# Patient Record
Sex: MALE | Race: BLACK OR AFRICAN AMERICAN | Employment: OTHER | URBAN - METROPOLITAN AREA
[De-identification: names, ages, dates, MRNs, and addresses within clinical notes are randomized per-mention and may not be internally consistent; named-entity substitution may affect disease eponyms.]

---

## 2018-06-07 ENCOUNTER — HOSPITAL ENCOUNTER (OUTPATIENT)
Age: 63
Setting detail: OBSERVATION
Discharge: HOME | End: 2018-06-09
Attending: EMERGENCY MEDICINE | Admitting: SURGERY

## 2018-06-07 ENCOUNTER — APPOINTMENT (OUTPATIENT)
Dept: GENERAL RADIOLOGY | Age: 63
End: 2018-06-07

## 2018-06-07 DIAGNOSIS — T18.5XXA FOREIGN BODY OF RECTUM, INITIAL ENCOUNTER: Primary | ICD-10-CM

## 2018-06-07 PROCEDURE — 99285 EMERGENCY DEPT VISIT HI MDM: CPT

## 2018-06-07 PROCEDURE — 74018 RADEX ABDOMEN 1 VIEW: CPT

## 2018-06-07 RX ORDER — MIDAZOLAM HYDROCHLORIDE 1 MG/ML
4 INJECTION INTRAMUSCULAR; INTRAVENOUS ONCE
Status: COMPLETED | OUTPATIENT
Start: 2018-06-07 | End: 2018-06-08

## 2018-06-07 RX ORDER — FENTANYL CITRATE 50 UG/ML
75 INJECTION, SOLUTION INTRAMUSCULAR; INTRAVENOUS ONCE
Status: COMPLETED | OUTPATIENT
Start: 2018-06-07 | End: 2018-06-08

## 2018-06-08 ENCOUNTER — ANESTHESIA EVENT (OUTPATIENT)
Dept: OPERATING ROOM | Age: 63
End: 2018-06-08

## 2018-06-08 ENCOUNTER — ANESTHESIA (OUTPATIENT)
Dept: OPERATING ROOM | Age: 63
End: 2018-06-08

## 2018-06-08 VITALS
OXYGEN SATURATION: 99 % | SYSTOLIC BLOOD PRESSURE: 106 MMHG | RESPIRATION RATE: 13 BRPM | DIASTOLIC BLOOD PRESSURE: 78 MMHG

## 2018-06-08 PROBLEM — T18.5XXA RECTAL FOREIGN BODY, INITIAL ENCOUNTER: Status: ACTIVE | Noted: 2018-06-08

## 2018-06-08 LAB
ANION GAP SERPL CALCULATED.3IONS-SCNC: 12 MMOL/L (ref 7–16)
BUN BLDV-MCNC: 15 MG/DL (ref 8–23)
CALCIUM SERPL-MCNC: 9.3 MG/DL (ref 8.6–10.2)
CHLORIDE BLD-SCNC: 100 MMOL/L (ref 98–107)
CO2: 27 MMOL/L (ref 22–29)
CREAT SERPL-MCNC: 0.8 MG/DL (ref 0.7–1.2)
GFR AFRICAN AMERICAN: >60
GFR NON-AFRICAN AMERICAN: >60 ML/MIN/1.73
GLUCOSE BLD-MCNC: 86 MG/DL (ref 74–109)
HCT VFR BLD CALC: 43 % (ref 37–54)
HEMOGLOBIN: 14.6 G/DL (ref 12.5–16.5)
INR BLD: 1.1
MCH RBC QN AUTO: 31 PG (ref 26–35)
MCHC RBC AUTO-ENTMCNC: 34 % (ref 32–34.5)
MCV RBC AUTO: 91.3 FL (ref 80–99.9)
PDW BLD-RTO: 14.2 FL (ref 11.5–15)
PLATELET # BLD: 247 E9/L (ref 130–450)
PMV BLD AUTO: 8.7 FL (ref 7–12)
POTASSIUM SERPL-SCNC: 4.1 MMOL/L (ref 3.5–5)
PROTHROMBIN TIME: 12.5 SEC (ref 9.3–12.4)
RBC # BLD: 4.71 E12/L (ref 3.8–5.8)
SODIUM BLD-SCNC: 139 MMOL/L (ref 132–146)
WBC # BLD: 5.9 E9/L (ref 4.5–11.5)

## 2018-06-08 PROCEDURE — 3700000000 HC ANESTHESIA ATTENDED CARE: Performed by: SURGERY

## 2018-06-08 PROCEDURE — 2709999900 HC NON-CHARGEABLE SUPPLY: Performed by: INTERNAL MEDICINE

## 2018-06-08 PROCEDURE — 7100000001 HC PACU RECOVERY - ADDTL 15 MIN: Performed by: SURGERY

## 2018-06-08 PROCEDURE — 80074 ACUTE HEPATITIS PANEL: CPT

## 2018-06-08 PROCEDURE — 6360000002 HC RX W HCPCS: Performed by: NURSE ANESTHETIST, CERTIFIED REGISTERED

## 2018-06-08 PROCEDURE — 3600000012 HC SURGERY LEVEL 2 ADDTL 15MIN: Performed by: SURGERY

## 2018-06-08 PROCEDURE — G0378 HOSPITAL OBSERVATION PER HR: HCPCS

## 2018-06-08 PROCEDURE — 3600000002 HC SURGERY LEVEL 2 BASE: Performed by: SURGERY

## 2018-06-08 PROCEDURE — 80048 BASIC METABOLIC PNL TOTAL CA: CPT

## 2018-06-08 PROCEDURE — 7100000001 HC PACU RECOVERY - ADDTL 15 MIN: Performed by: INTERNAL MEDICINE

## 2018-06-08 PROCEDURE — 7100000000 HC PACU RECOVERY - FIRST 15 MIN: Performed by: INTERNAL MEDICINE

## 2018-06-08 PROCEDURE — 3609008400 HC SIGMOIDOSCOPY DIAGNOSTIC: Performed by: INTERNAL MEDICINE

## 2018-06-08 PROCEDURE — 99221 1ST HOSP IP/OBS SF/LOW 40: CPT | Performed by: PSYCHIATRY & NEUROLOGY

## 2018-06-08 PROCEDURE — 2580000003 HC RX 258: Performed by: INTERNAL MEDICINE

## 2018-06-08 PROCEDURE — 96372 THER/PROPH/DIAG INJ SC/IM: CPT

## 2018-06-08 PROCEDURE — 85027 COMPLETE CBC AUTOMATED: CPT

## 2018-06-08 PROCEDURE — 88300 SURGICAL PATH GROSS: CPT

## 2018-06-08 PROCEDURE — 3700000001 HC ADD 15 MINUTES (ANESTHESIA): Performed by: SURGERY

## 2018-06-08 PROCEDURE — 86703 HIV-1/HIV-2 1 RESULT ANTBDY: CPT

## 2018-06-08 PROCEDURE — 36415 COLL VENOUS BLD VENIPUNCTURE: CPT

## 2018-06-08 PROCEDURE — 7100000000 HC PACU RECOVERY - FIRST 15 MIN: Performed by: SURGERY

## 2018-06-08 PROCEDURE — 6360000002 HC RX W HCPCS: Performed by: EMERGENCY MEDICINE

## 2018-06-08 PROCEDURE — 85610 PROTHROMBIN TIME: CPT

## 2018-06-08 RX ORDER — MIDAZOLAM HYDROCHLORIDE 1 MG/ML
INJECTION INTRAMUSCULAR; INTRAVENOUS PRN
Status: DISCONTINUED | OUTPATIENT
Start: 2018-06-08 | End: 2018-06-08 | Stop reason: SDUPTHER

## 2018-06-08 RX ORDER — SODIUM CHLORIDE 0.9 % (FLUSH) 0.9 %
10 SYRINGE (ML) INJECTION EVERY 12 HOURS SCHEDULED
Status: DISCONTINUED | OUTPATIENT
Start: 2018-06-08 | End: 2018-06-09 | Stop reason: HOSPADM

## 2018-06-08 RX ORDER — SODIUM CHLORIDE 0.9 % (FLUSH) 0.9 %
10 SYRINGE (ML) INJECTION PRN
Status: DISCONTINUED | OUTPATIENT
Start: 2018-06-08 | End: 2018-06-09 | Stop reason: HOSPADM

## 2018-06-08 RX ORDER — MEPERIDINE HYDROCHLORIDE 25 MG/ML
12.5 INJECTION INTRAMUSCULAR; INTRAVENOUS; SUBCUTANEOUS EVERY 5 MIN PRN
Status: DISCONTINUED | OUTPATIENT
Start: 2018-06-08 | End: 2018-06-08 | Stop reason: HOSPADM

## 2018-06-08 RX ORDER — PROMETHAZINE HYDROCHLORIDE 25 MG/ML
6.25 INJECTION, SOLUTION INTRAMUSCULAR; INTRAVENOUS
Status: DISCONTINUED | OUTPATIENT
Start: 2018-06-08 | End: 2018-06-08 | Stop reason: HOSPADM

## 2018-06-08 RX ORDER — ONDANSETRON 2 MG/ML
4 INJECTION INTRAMUSCULAR; INTRAVENOUS
Status: DISCONTINUED | OUTPATIENT
Start: 2018-06-08 | End: 2018-06-08 | Stop reason: HOSPADM

## 2018-06-08 RX ORDER — FENTANYL CITRATE 50 UG/ML
50 INJECTION, SOLUTION INTRAMUSCULAR; INTRAVENOUS EVERY 5 MIN PRN
Status: DISCONTINUED | OUTPATIENT
Start: 2018-06-08 | End: 2018-06-08 | Stop reason: HOSPADM

## 2018-06-08 RX ORDER — FENTANYL CITRATE 50 UG/ML
INJECTION, SOLUTION INTRAMUSCULAR; INTRAVENOUS PRN
Status: DISCONTINUED | OUTPATIENT
Start: 2018-06-08 | End: 2018-06-08 | Stop reason: SDUPTHER

## 2018-06-08 RX ORDER — PROPOFOL 10 MG/ML
INJECTION, EMULSION INTRAVENOUS CONTINUOUS PRN
Status: DISCONTINUED | OUTPATIENT
Start: 2018-06-08 | End: 2018-06-08 | Stop reason: SDUPTHER

## 2018-06-08 RX ORDER — KETAMINE HYDROCHLORIDE 50 MG/ML
INJECTION, SOLUTION, CONCENTRATE INTRAMUSCULAR; INTRAVENOUS
Status: DISPENSED
Start: 2018-06-08 | End: 2018-06-08

## 2018-06-08 RX ORDER — SODIUM CHLORIDE, SODIUM LACTATE, POTASSIUM CHLORIDE, CALCIUM CHLORIDE 600; 310; 30; 20 MG/100ML; MG/100ML; MG/100ML; MG/100ML
INJECTION, SOLUTION INTRAVENOUS CONTINUOUS
Status: DISCONTINUED | OUTPATIENT
Start: 2018-06-08 | End: 2018-06-09 | Stop reason: HOSPADM

## 2018-06-08 RX ORDER — FENTANYL CITRATE 50 UG/ML
25 INJECTION, SOLUTION INTRAMUSCULAR; INTRAVENOUS EVERY 5 MIN PRN
Status: DISCONTINUED | OUTPATIENT
Start: 2018-06-08 | End: 2018-06-08 | Stop reason: HOSPADM

## 2018-06-08 RX ADMIN — Medication 10 ML: at 10:51

## 2018-06-08 RX ADMIN — SODIUM CHLORIDE, POTASSIUM CHLORIDE, SODIUM LACTATE AND CALCIUM CHLORIDE: 600; 310; 30; 20 INJECTION, SOLUTION INTRAVENOUS at 15:31

## 2018-06-08 RX ADMIN — MIDAZOLAM HYDROCHLORIDE 4 MG: 1 INJECTION, SOLUTION INTRAMUSCULAR; INTRAVENOUS at 00:22

## 2018-06-08 RX ADMIN — SODIUM CHLORIDE, POTASSIUM CHLORIDE, SODIUM LACTATE AND CALCIUM CHLORIDE: 600; 310; 30; 20 INJECTION, SOLUTION INTRAVENOUS at 20:48

## 2018-06-08 RX ADMIN — SODIUM CHLORIDE, POTASSIUM CHLORIDE, SODIUM LACTATE AND CALCIUM CHLORIDE: 600; 310; 30; 20 INJECTION, SOLUTION INTRAVENOUS at 10:45

## 2018-06-08 RX ADMIN — PROPOFOL 100 MCG/KG/MIN: 10 INJECTION, EMULSION INTRAVENOUS at 15:35

## 2018-06-08 RX ADMIN — FENTANYL CITRATE 75 MCG: 50 INJECTION, SOLUTION INTRAMUSCULAR; INTRAVENOUS at 00:24

## 2018-06-08 RX ADMIN — MIDAZOLAM HYDROCHLORIDE 2 MG: 1 INJECTION INTRAMUSCULAR; INTRAVENOUS at 15:35

## 2018-06-08 RX ADMIN — FENTANYL CITRATE 100 MCG: 50 INJECTION, SOLUTION INTRAMUSCULAR; INTRAVENOUS at 15:35

## 2018-06-08 ASSESSMENT — PAIN DESCRIPTION - PAIN TYPE
TYPE: ACUTE PAIN
TYPE: ACUTE PAIN
TYPE: SURGICAL PAIN

## 2018-06-08 ASSESSMENT — ENCOUNTER SYMPTOMS
NAUSEA: 0
DIARRHEA: 0
RECTAL PAIN: 1
BACK PAIN: 0
VOMITING: 0
SHORTNESS OF BREATH: 0
SORE THROAT: 0
ABDOMINAL DISTENTION: 0
COUGH: 0
RHINORRHEA: 0

## 2018-06-08 ASSESSMENT — PAIN DESCRIPTION - ONSET
ONSET: ON-GOING
ONSET: ON-GOING

## 2018-06-08 ASSESSMENT — PAIN SCALES - GENERAL
PAINLEVEL_OUTOF10: 0
PAINLEVEL_OUTOF10: 3
PAINLEVEL_OUTOF10: 2
PAINLEVEL_OUTOF10: 0
PAINLEVEL_OUTOF10: 7
PAINLEVEL_OUTOF10: 5

## 2018-06-08 ASSESSMENT — PULMONARY FUNCTION TESTS
PIF_VALUE: 0
PIF_VALUE: 1
PIF_VALUE: 0
PIF_VALUE: 1
PIF_VALUE: 0
PIF_VALUE: 1
PIF_VALUE: 0

## 2018-06-08 ASSESSMENT — PAIN DESCRIPTION - LOCATION
LOCATION: RECTUM

## 2018-06-08 ASSESSMENT — PAIN DESCRIPTION - PROGRESSION
CLINICAL_PROGRESSION: GRADUALLY IMPROVING
CLINICAL_PROGRESSION: GRADUALLY WORSENING

## 2018-06-08 ASSESSMENT — PAIN DESCRIPTION - ORIENTATION: ORIENTATION: MID

## 2018-06-08 ASSESSMENT — PAIN DESCRIPTION - DESCRIPTORS: DESCRIPTORS: DISCOMFORT

## 2018-06-08 ASSESSMENT — PAIN DESCRIPTION - FREQUENCY
FREQUENCY: CONTINUOUS
FREQUENCY: CONTINUOUS

## 2018-06-08 ASSESSMENT — LIFESTYLE VARIABLES: SMOKING_STATUS: 1

## 2018-06-09 VITALS
DIASTOLIC BLOOD PRESSURE: 81 MMHG | WEIGHT: 150 LBS | TEMPERATURE: 97 F | HEIGHT: 72 IN | OXYGEN SATURATION: 98 % | RESPIRATION RATE: 18 BRPM | BODY MASS INDEX: 20.32 KG/M2 | SYSTOLIC BLOOD PRESSURE: 132 MMHG | HEART RATE: 85 BPM

## 2018-06-09 PROCEDURE — G0378 HOSPITAL OBSERVATION PER HR: HCPCS

## 2018-06-09 PROCEDURE — 2580000003 HC RX 258: Performed by: INTERNAL MEDICINE

## 2018-06-09 RX ADMIN — SODIUM CHLORIDE, POTASSIUM CHLORIDE, SODIUM LACTATE AND CALCIUM CHLORIDE: 600; 310; 30; 20 INJECTION, SOLUTION INTRAVENOUS at 05:53

## 2018-06-09 ASSESSMENT — PAIN SCALES - GENERAL
PAINLEVEL_OUTOF10: 0
PAINLEVEL_OUTOF10: 0

## 2018-06-11 LAB
HAV IGM SER IA-ACNC: NORMAL
HEPATITIS B CORE IGM ANTIBODY: NORMAL
HEPATITIS B SURFACE ANTIGEN INTERPRETATION: NORMAL
HEPATITIS C ANTIBODY INTERPRETATION: NORMAL
HIV-1 AND HIV-2 ANTIBODIES: NORMAL

## 2020-11-20 ENCOUNTER — APPOINTMENT (OUTPATIENT)
Dept: CT IMAGING | Age: 65
DRG: 064 | End: 2020-11-20
Payer: OTHER GOVERNMENT

## 2020-11-20 ENCOUNTER — HOSPITAL ENCOUNTER (INPATIENT)
Age: 65
LOS: 6 days | Discharge: HOME | DRG: 064 | End: 2020-11-26
Attending: EMERGENCY MEDICINE | Admitting: INTERNAL MEDICINE
Payer: OTHER GOVERNMENT

## 2020-11-20 ENCOUNTER — APPOINTMENT (OUTPATIENT)
Dept: GENERAL RADIOLOGY | Age: 65
DRG: 064 | End: 2020-11-20
Payer: OTHER GOVERNMENT

## 2020-11-20 PROBLEM — R27.0 ATAXIA: Status: ACTIVE | Noted: 2020-11-20

## 2020-11-20 LAB
ACETAMINOPHEN LEVEL: <5 MCG/ML (ref 10–30)
ALBUMIN SERPL-MCNC: 4 G/DL (ref 3.5–5.2)
ALP BLD-CCNC: 94 U/L (ref 40–129)
ALT SERPL-CCNC: 11 U/L (ref 0–40)
AMORPHOUS: NORMAL
AMPHETAMINE SCREEN, URINE: NOT DETECTED
ANION GAP SERPL CALCULATED.3IONS-SCNC: 10 MMOL/L (ref 7–16)
AST SERPL-CCNC: 15 U/L (ref 0–39)
BACTERIA: NORMAL /HPF
BARBITURATE SCREEN URINE: NOT DETECTED
BASOPHILS ABSOLUTE: 0.03 E9/L (ref 0–0.2)
BASOPHILS RELATIVE PERCENT: 0.4 % (ref 0–2)
BENZODIAZEPINE SCREEN, URINE: NOT DETECTED
BILIRUB SERPL-MCNC: 0.3 MG/DL (ref 0–1.2)
BILIRUBIN URINE: NEGATIVE
BLOOD, URINE: NEGATIVE
BUN BLDV-MCNC: 14 MG/DL (ref 8–23)
CALCIUM SERPL-MCNC: 9.2 MG/DL (ref 8.6–10.2)
CANNABINOID SCREEN URINE: POSITIVE
CHLORIDE BLD-SCNC: 99 MMOL/L (ref 98–107)
CLARITY: ABNORMAL
CO2: 28 MMOL/L (ref 22–29)
COCAINE METABOLITE SCREEN URINE: POSITIVE
COLOR: YELLOW
CREAT SERPL-MCNC: 1 MG/DL (ref 0.7–1.2)
EOSINOPHILS ABSOLUTE: 0.06 E9/L (ref 0.05–0.5)
EOSINOPHILS RELATIVE PERCENT: 0.9 % (ref 0–6)
EPITHELIAL CELLS, UA: NORMAL /HPF
ETHANOL: <10 MG/DL (ref 0–0.08)
FENTANYL SCREEN, URINE: NOT DETECTED
GFR AFRICAN AMERICAN: >60
GFR NON-AFRICAN AMERICAN: >60 ML/MIN/1.73
GLUCOSE BLD-MCNC: 103 MG/DL (ref 74–99)
GLUCOSE URINE: NEGATIVE MG/DL
HCT VFR BLD CALC: 40.5 % (ref 37–54)
HEMOGLOBIN: 13.6 G/DL (ref 12.5–16.5)
IMMATURE GRANULOCYTES #: 0 E9/L
IMMATURE GRANULOCYTES %: 0 % (ref 0–5)
KETONES, URINE: NEGATIVE MG/DL
LEUKOCYTE ESTERASE, URINE: NEGATIVE
LIPASE: 12 U/L (ref 13–60)
LYMPHOCYTES ABSOLUTE: 1.08 E9/L (ref 1.5–4)
LYMPHOCYTES RELATIVE PERCENT: 16.1 % (ref 20–42)
Lab: ABNORMAL
MCH RBC QN AUTO: 31.1 PG (ref 26–35)
MCHC RBC AUTO-ENTMCNC: 33.6 % (ref 32–34.5)
MCV RBC AUTO: 92.5 FL (ref 80–99.9)
METHADONE SCREEN, URINE: NOT DETECTED
MONOCYTES ABSOLUTE: 0.29 E9/L (ref 0.1–0.95)
MONOCYTES RELATIVE PERCENT: 4.3 % (ref 2–12)
NEUTROPHILS ABSOLUTE: 5.26 E9/L (ref 1.8–7.3)
NEUTROPHILS RELATIVE PERCENT: 78.3 % (ref 43–80)
NITRITE, URINE: NEGATIVE
OPIATE SCREEN URINE: NOT DETECTED
OXYCODONE URINE: NOT DETECTED
PDW BLD-RTO: 13.5 FL (ref 11.5–15)
PH UA: 7 (ref 5–9)
PHENCYCLIDINE SCREEN URINE: NOT DETECTED
PLATELET # BLD: 203 E9/L (ref 130–450)
PMV BLD AUTO: 8.8 FL (ref 7–12)
POTASSIUM REFLEX MAGNESIUM: 4 MMOL/L (ref 3.5–5)
PRO-BNP: 69 PG/ML (ref 0–125)
PROTEIN UA: NEGATIVE MG/DL
RBC # BLD: 4.38 E12/L (ref 3.8–5.8)
RBC UA: NORMAL /HPF (ref 0–2)
SALICYLATE, SERUM: <0.3 MG/DL (ref 0–30)
SODIUM BLD-SCNC: 137 MMOL/L (ref 132–146)
SPECIFIC GRAVITY UA: 1.02 (ref 1–1.03)
TOTAL PROTEIN: 7.5 G/DL (ref 6.4–8.3)
TRICYCLIC ANTIDEPRESSANTS SCREEN SERUM: NEGATIVE NG/ML
TROPONIN: <0.01 NG/ML (ref 0–0.03)
TSH SERPL DL<=0.05 MIU/L-ACNC: 1.73 UIU/ML (ref 0.27–4.2)
UROBILINOGEN, URINE: 0.2 E.U./DL
WBC # BLD: 6.7 E9/L (ref 4.5–11.5)
WBC UA: NORMAL /HPF (ref 0–5)

## 2020-11-20 PROCEDURE — G0480 DRUG TEST DEF 1-7 CLASSES: HCPCS

## 2020-11-20 PROCEDURE — 99223 1ST HOSP IP/OBS HIGH 75: CPT | Performed by: INTERNAL MEDICINE

## 2020-11-20 PROCEDURE — 83880 ASSAY OF NATRIURETIC PEPTIDE: CPT

## 2020-11-20 PROCEDURE — 6360000002 HC RX W HCPCS: Performed by: STUDENT IN AN ORGANIZED HEALTH CARE EDUCATION/TRAINING PROGRAM

## 2020-11-20 PROCEDURE — 93005 ELECTROCARDIOGRAM TRACING: CPT | Performed by: STUDENT IN AN ORGANIZED HEALTH CARE EDUCATION/TRAINING PROGRAM

## 2020-11-20 PROCEDURE — 80053 COMPREHEN METABOLIC PANEL: CPT

## 2020-11-20 PROCEDURE — 96375 TX/PRO/DX INJ NEW DRUG ADDON: CPT

## 2020-11-20 PROCEDURE — 96374 THER/PROPH/DIAG INJ IV PUSH: CPT

## 2020-11-20 PROCEDURE — 85025 COMPLETE CBC W/AUTO DIFF WBC: CPT

## 2020-11-20 PROCEDURE — 81001 URINALYSIS AUTO W/SCOPE: CPT

## 2020-11-20 PROCEDURE — 87088 URINE BACTERIA CULTURE: CPT

## 2020-11-20 PROCEDURE — 6360000004 HC RX CONTRAST MEDICATION: Performed by: RADIOLOGY

## 2020-11-20 PROCEDURE — 99285 EMERGENCY DEPT VISIT HI MDM: CPT

## 2020-11-20 PROCEDURE — 83690 ASSAY OF LIPASE: CPT

## 2020-11-20 PROCEDURE — 84484 ASSAY OF TROPONIN QUANT: CPT

## 2020-11-20 PROCEDURE — 1200000000 HC SEMI PRIVATE

## 2020-11-20 PROCEDURE — 84443 ASSAY THYROID STIM HORMONE: CPT

## 2020-11-20 PROCEDURE — 80307 DRUG TEST PRSMV CHEM ANLYZR: CPT

## 2020-11-20 PROCEDURE — 6360000002 HC RX W HCPCS

## 2020-11-20 PROCEDURE — 71045 X-RAY EXAM CHEST 1 VIEW: CPT

## 2020-11-20 PROCEDURE — 70496 CT ANGIOGRAPHY HEAD: CPT

## 2020-11-20 PROCEDURE — 0202U NFCT DS 22 TRGT SARS-COV-2: CPT

## 2020-11-20 RX ORDER — ACETAMINOPHEN 325 MG/1
650 TABLET ORAL EVERY 6 HOURS PRN
Status: DISCONTINUED | OUTPATIENT
Start: 2020-11-20 | End: 2020-11-26 | Stop reason: HOSPADM

## 2020-11-20 RX ORDER — POLYETHYLENE GLYCOL 3350 17 G/17G
17 POWDER, FOR SOLUTION ORAL DAILY PRN
Status: DISCONTINUED | OUTPATIENT
Start: 2020-11-20 | End: 2020-11-26 | Stop reason: HOSPADM

## 2020-11-20 RX ORDER — ONDANSETRON 2 MG/ML
4 INJECTION INTRAMUSCULAR; INTRAVENOUS ONCE
Status: COMPLETED | OUTPATIENT
Start: 2020-11-20 | End: 2020-11-20

## 2020-11-20 RX ORDER — ONDANSETRON 4 MG/1
4 TABLET, ORALLY DISINTEGRATING ORAL ONCE
Status: DISCONTINUED | OUTPATIENT
Start: 2020-11-20 | End: 2020-11-26 | Stop reason: HOSPADM

## 2020-11-20 RX ORDER — SODIUM CHLORIDE 0.9 % (FLUSH) 0.9 %
10 SYRINGE (ML) INJECTION PRN
Status: DISCONTINUED | OUTPATIENT
Start: 2020-11-20 | End: 2020-11-21 | Stop reason: SDUPTHER

## 2020-11-20 RX ORDER — LORAZEPAM 2 MG/ML
0.5 INJECTION INTRAMUSCULAR ONCE
Status: COMPLETED | OUTPATIENT
Start: 2020-11-20 | End: 2020-11-20

## 2020-11-20 RX ORDER — ACETAMINOPHEN 650 MG/1
650 SUPPOSITORY RECTAL EVERY 6 HOURS PRN
Status: DISCONTINUED | OUTPATIENT
Start: 2020-11-20 | End: 2020-11-26 | Stop reason: HOSPADM

## 2020-11-20 RX ORDER — DROPERIDOL 2.5 MG/ML
1.25 INJECTION, SOLUTION INTRAMUSCULAR; INTRAVENOUS ONCE
Status: COMPLETED | OUTPATIENT
Start: 2020-11-20 | End: 2020-11-20

## 2020-11-20 RX ORDER — ASPIRIN 81 MG/1
324 TABLET, CHEWABLE ORAL ONCE
Status: DISCONTINUED | OUTPATIENT
Start: 2020-11-20 | End: 2020-11-26 | Stop reason: HOSPADM

## 2020-11-20 RX ORDER — SODIUM CHLORIDE 0.9 % (FLUSH) 0.9 %
10 SYRINGE (ML) INJECTION EVERY 12 HOURS SCHEDULED
Status: DISCONTINUED | OUTPATIENT
Start: 2020-11-20 | End: 2020-11-21 | Stop reason: SDUPTHER

## 2020-11-20 RX ORDER — THIAMINE HYDROCHLORIDE 100 MG/ML
100 INJECTION, SOLUTION INTRAMUSCULAR; INTRAVENOUS ONCE
Status: COMPLETED | OUTPATIENT
Start: 2020-11-20 | End: 2020-11-20

## 2020-11-20 RX ORDER — NICOTINE 21 MG/24HR
1 PATCH, TRANSDERMAL 24 HOURS TRANSDERMAL DAILY
Status: DISCONTINUED | OUTPATIENT
Start: 2020-11-20 | End: 2020-11-21

## 2020-11-20 RX ORDER — SODIUM CHLORIDE 9 MG/ML
INJECTION, SOLUTION INTRAVENOUS CONTINUOUS
Status: DISCONTINUED | OUTPATIENT
Start: 2020-11-20 | End: 2020-11-23

## 2020-11-20 RX ORDER — ONDANSETRON 2 MG/ML
INJECTION INTRAMUSCULAR; INTRAVENOUS
Status: COMPLETED
Start: 2020-11-20 | End: 2020-11-20

## 2020-11-20 RX ORDER — ONDANSETRON 2 MG/ML
4 INJECTION INTRAMUSCULAR; INTRAVENOUS EVERY 6 HOURS PRN
Status: DISCONTINUED | OUTPATIENT
Start: 2020-11-20 | End: 2020-11-26 | Stop reason: HOSPADM

## 2020-11-20 RX ADMIN — IOPAMIDOL 75 ML: 755 INJECTION, SOLUTION INTRAVENOUS at 17:51

## 2020-11-20 RX ADMIN — ONDANSETRON 4 MG: 2 INJECTION INTRAMUSCULAR; INTRAVENOUS at 20:54

## 2020-11-20 RX ADMIN — DROPERIDOL 1.25 MG: 2.5 INJECTION, SOLUTION INTRAMUSCULAR; INTRAVENOUS at 17:34

## 2020-11-20 RX ADMIN — THIAMINE HYDROCHLORIDE 100 MG: 100 INJECTION, SOLUTION INTRAMUSCULAR; INTRAVENOUS at 16:44

## 2020-11-20 RX ADMIN — LORAZEPAM 1 MG: 2 INJECTION INTRAMUSCULAR; INTRAVENOUS at 15:45

## 2020-11-20 ASSESSMENT — ENCOUNTER SYMPTOMS
RHINORRHEA: 0
BLOOD IN STOOL: 0
SHORTNESS OF BREATH: 0
ABDOMINAL PAIN: 0
DIARRHEA: 0
SORE THROAT: 0
COUGH: 0
WHEEZING: 0
VOMITING: 1
NAUSEA: 1

## 2020-11-20 NOTE — ED PROVIDER NOTES
[facility]  Department of Emergency Medicine     Written by: Sandra Lozano DO  Patient Name: Annemarie Rinaldi  Attending Provider: Kevin Hill MD  Admit Date: 2020  2:22 PM  MRN: [id number]                   : 1955        Chief Complaint   Patient presents with    Psychiatric Evaluation     talking to god, god only talks to him, pt has bible that he is reading, pt found by EMS in bed of truck flapping arms and talking to god    Emesis     1x thinks he suffered from food poisoning    - Chief complaint    HPI     Patient is a 50-year-old male with possible history of schizophrenia (no official documentation or diagnosis per chart review) who presents to the ED via EMS due to reasons which are difficult ascertain; according to nursing staff they were told by EMS that they initially received a call level sometime earlier today from this patient's nephew for \"shortness of breath\", but were subsequently called to cancel. They were then later called because this patient had apparently \"woken up for the seventh time today\" and after you wake up for the seventh time that is when you die according to God? Patient also admits to x1 episode of vomiting today. On further evaluation it seems that patient has also been experiencing dizziness and gait instability x2 days which occur with movement. Per patient he is here because he thinks he has \"food poisoning\", states he vomited once today and that he had some sort of beef/rice/gravy this morning. Unsure of this patient's baseline mental status, where he lives, what medications he takes, his medical history, or if he sees any physicians for anything. Only able to tell us that he lives with his nephew.  Work-up based off reported complaints from EMS and patient, as well as patient's psychiatric presentation on arrival. Of note patient's last encounter per chart review was in 2018 for rectal foreign body; H&P from general surgeon at that time supple. Cardiovascular:      Rate and Rhythm: Normal rate and regular rhythm. Pulses: Normal pulses. Heart sounds: Normal heart sounds. Pulmonary:      Effort: Pulmonary effort is normal. No respiratory distress. Breath sounds: Normal breath sounds. No wheezing or rales. Abdominal:      General: Abdomen is flat. Bowel sounds are normal. There is no distension. Palpations: Abdomen is soft. Tenderness: There is no abdominal tenderness. There is no guarding. Musculoskeletal: Normal range of motion. General: No swelling or tenderness. Right lower leg: No edema. Left lower leg: No edema. Skin:     General: Skin is warm and dry. Neurological:      Mental Status: He is alert. Cranial Nerves: No dysarthria or facial asymmetry. Coordination: Finger-Nose-Finger Test abnormal (on the right). NIH Stroke Scale at time of initial evaluation:  1A: Level of Consciousness 0 - alert; keenly responsive   1B: Ask Month and Age 0 - answers both questions correctly   1C:  Tell Patient To Open and Close Eyes, then Hand  Squeeze 0 - performs both tasks correctly   2: Test Horizontal Extraocular Movements 0 - normal   3: Test Visual Fields 0 - no visual loss   4: Test Facial Palsy 0 - normal symmetric movement   5A: Test Left Arm Motor Drift 0 - no drift, limb holds 90 (or 45) degrees for full 10 seconds   5B: Test Right Arm Motor Drift 0 - no drift, limb holds 90 (or 45) degrees for full 10 seconds   6A: Test Left Leg Motor Drift 0 - no drift; leg holds 30 degree position for full 5 seconds   6B: Test Right Leg Motor Drift 0 - no drift; leg holds 30 degree position for full 5 seconds   7: Test Limb Ataxia   (FNF/Heel-Shin) 1 - present in one limb   8: Test Sensation 0 - normal; no sensory loss   9: Test Language/Aphasia 0 - no aphasia, normal   10: Test Dysarthria 0 - normal   11: Test Extinction/Inattention 0 - no abnormality   Total 1       Acute CVA Core Measures:       Procedures       MDM     76-year-old male presents to the ED due to with possible history of schizophrenia (no official documentation or diagnosis per chart review) who presents to the ED via EMS due to reasons which are difficult ascertain; according to nursing staff they were told by EMS that they initially received a call level sometime earlier today from this patient's nephew for \"shortness of breath\", but were subsequently called to cancel. They were then later called because this patient had apparently \"woken up for the seventh time today\" and after you wake up for the seventh time that is when you die according to god? Patient also admits to x1 episode of vomiting today. Here in the ED patient is alert and oriented, having tangential thoughts, AH, and delusions; denies SI; is obstructive to most of the history and physical. Exam ED shows abnormal finger to nose on right, truncal ataxia, and unsteady gait; patient also has delusions, and is very obstructive and eventually became more combative; was given 1 mg IV Ativan for both agitation and dizziness; later given 1.25 IV droperidol for agitation and aggressive combativeness. Work-up thus far shows positive UDS for cannabinoids and cocaine metabolites; labs otherwise negative. Please see ED course for further details. 1750: At this time patient is currently stable; disposition pending current workup including CT head without contrast, CXR, and CTA head with contrast.      I have discussed this patient with my attending, who has seen the patient and agrees with this disposition. Patient was seen and evaluated by myself and my attending Nicko Fontaine MD. Assessment and Plan discussed with attending provider, please see attestation for final plan of care.      ED Course as of Nov 20 1758 Fri Nov 20, 2020   1520 Patient evaluated, states that he does not want to be examined; refuses vitals, told nursing staff that if anyone touches him he smoking. He has never used smokeless tobacco. He reports current alcohol use. He reports current drug use. Drug: Marijuana. Family History: family history is not on file. The patients home medications have been reviewed. Allergies: Patient has no known allergies.     -------------------------------------------------- RESULTS -------------------------------------------------    LABS:  Results for orders placed or performed during the hospital encounter of 11/20/20   CBC Auto Differential   Result Value Ref Range    WBC 6.7 4.5 - 11.5 E9/L    RBC 4.38 3.80 - 5.80 E12/L    Hemoglobin 13.6 12.5 - 16.5 g/dL    Hematocrit 40.5 37.0 - 54.0 %    MCV 92.5 80.0 - 99.9 fL    MCH 31.1 26.0 - 35.0 pg    MCHC 33.6 32.0 - 34.5 %    RDW 13.5 11.5 - 15.0 fL    Platelets 269 373 - 233 E9/L    MPV 8.8 7.0 - 12.0 fL    Neutrophils % 78.3 43.0 - 80.0 %    Immature Granulocytes % 0.0 0.0 - 5.0 %    Lymphocytes % 16.1 (L) 20.0 - 42.0 %    Monocytes % 4.3 2.0 - 12.0 %    Eosinophils % 0.9 0.0 - 6.0 %    Basophils % 0.4 0.0 - 2.0 %    Neutrophils Absolute 5.26 1.80 - 7.30 E9/L    Immature Granulocytes # 0.00 E9/L    Lymphocytes Absolute 1.08 (L) 1.50 - 4.00 E9/L    Monocytes Absolute 0.29 0.10 - 0.95 E9/L    Eosinophils Absolute 0.06 0.05 - 0.50 E9/L    Basophils Absolute 0.03 0.00 - 0.20 E9/L   Comprehensive Metabolic Panel w/ Reflex to MG   Result Value Ref Range    Sodium 137 132 - 146 mmol/L    Potassium reflex Magnesium 4.0 3.5 - 5.0 mmol/L    Chloride 99 98 - 107 mmol/L    CO2 28 22 - 29 mmol/L    Anion Gap 10 7 - 16 mmol/L    Glucose 103 (H) 74 - 99 mg/dL    BUN 14 8 - 23 mg/dL    CREATININE 1.0 0.7 - 1.2 mg/dL    GFR Non-African American >60 >=60 mL/min/1.73    GFR African American >60     Calcium 9.2 8.6 - 10.2 mg/dL    Total Protein 7.5 6.4 - 8.3 g/dL    Alb 4.0 3.5 - 5.2 g/dL    Total Bilirubin 0.3 0.0 - 1.2 mg/dL    Alkaline Phosphatase 94 40 - 129 U/L    ALT 11 0 - 40 U/L    AST 15 0 - 39 U/L   Lipase   Result Value Ref Range    Lipase 12 (L) 13 - 60 U/L   Troponin   Result Value Ref Range    Troponin <0.01 0.00 - 0.03 ng/mL   Brain Natriuretic Peptide   Result Value Ref Range    Pro-BNP 69 0 - 125 pg/mL   Urinalysis, reflex to microscopic   Result Value Ref Range    Color, UA Yellow Straw/Yellow    Clarity, UA CLOUDY (A) Clear    Glucose, Ur Negative Negative mg/dL    Bilirubin Urine Negative Negative    Ketones, Urine Negative Negative mg/dL    Specific Gravity, UA 1.020 1.005 - 1.030    Blood, Urine Negative Negative    pH, UA 7.0 5.0 - 9.0    Protein, UA Negative Negative mg/dL    Urobilinogen, Urine 0.2 <2.0 E.U./dL    Nitrite, Urine Negative Negative    Leukocyte Esterase, Urine Negative Negative   URINE DRUG SCREEN   Result Value Ref Range    Amphetamine Screen, Urine NOT DETECTED Negative <1000 ng/mL    Barbiturate Screen, Ur NOT DETECTED Negative < 200 ng/mL    Benzodiazepine Screen, Urine NOT DETECTED Negative < 200 ng/mL    Cannabinoid Scrn, Ur POSITIVE (A) Negative < 50ng/mL    Cocaine Metabolite Screen, Urine POSITIVE (A) Negative < 300 ng/mL    Opiate Scrn, Ur NOT DETECTED Negative < 300ng/mL    PCP Screen, Urine NOT DETECTED Negative < 25 ng/mL    Methadone Screen, Urine NOT DETECTED Negative <300 ng/mL    Oxycodone Urine NOT DETECTED Negative <100 ng/mL    FENTANYL SCREEN, URINE NOT DETECTED Negative <1 ng/mL    Drug Screen Comment: see below    Serum Drug Screen   Result Value Ref Range    Ethanol Lvl <10 mg/dL    Acetaminophen Level <5.0 (L) 10.0 - 95.6 mcg/mL    Salicylate, Serum <5.8 0.0 - 30.0 mg/dL   Microscopic Urinalysis   Result Value Ref Range    WBC, UA 0-1 0 - 5 /HPF    RBC, UA 0-1 0 - 2 /HPF    Epithelial Cells, UA NONE SEEN /HPF    Bacteria, UA NONE SEEN None Seen /HPF    Amorphous, UA FEW    EKG 12 Lead   Result Value Ref Range    Ventricular Rate 57 BPM    Atrial Rate 57 BPM    P-R Interval 174 ms    QRS Duration 84 ms    Q-T Interval 436 ms    QTc Calculation (Bazett) 424 ms    P Axis 77 degrees

## 2020-11-20 NOTE — ED NOTES
Bed: 21  Expected date:   Expected time:   Means of arrival:   Comments:  Germán Sprague RN  11/20/20 2823

## 2020-11-21 ENCOUNTER — APPOINTMENT (OUTPATIENT)
Dept: MRI IMAGING | Age: 65
DRG: 064 | End: 2020-11-21
Payer: OTHER GOVERNMENT

## 2020-11-21 ENCOUNTER — APPOINTMENT (OUTPATIENT)
Dept: GENERAL RADIOLOGY | Age: 65
DRG: 064 | End: 2020-11-21
Payer: OTHER GOVERNMENT

## 2020-11-21 PROBLEM — Z72.0 TOBACCO ABUSE: Status: ACTIVE | Noted: 2020-11-21

## 2020-11-21 PROBLEM — F19.10 DRUG ABUSE (HCC): Status: ACTIVE | Noted: 2020-11-21

## 2020-11-21 PROBLEM — F99 PSYCHIATRIC DISORDER: Status: ACTIVE | Noted: 2020-11-21

## 2020-11-21 PROBLEM — J18.9 PNEUMONIA: Status: ACTIVE | Noted: 2020-11-21

## 2020-11-21 LAB
ADENOVIRUS BY PCR: NOT DETECTED
ANION GAP SERPL CALCULATED.3IONS-SCNC: 12 MMOL/L (ref 7–16)
BASOPHILS ABSOLUTE: 0.03 E9/L (ref 0–0.2)
BASOPHILS RELATIVE PERCENT: 0.4 % (ref 0–2)
BORDETELLA PARAPERTUSSIS BY PCR: NOT DETECTED
BORDETELLA PERTUSSIS BY PCR: NOT DETECTED
BUN BLDV-MCNC: 11 MG/DL (ref 8–23)
CALCIUM SERPL-MCNC: 9.7 MG/DL (ref 8.6–10.2)
CHLAMYDOPHILIA PNEUMONIAE BY PCR: NOT DETECTED
CHLORIDE BLD-SCNC: 100 MMOL/L (ref 98–107)
CO2: 26 MMOL/L (ref 22–29)
CORONAVIRUS 229E BY PCR: NOT DETECTED
CORONAVIRUS HKU1 BY PCR: NOT DETECTED
CORONAVIRUS NL63 BY PCR: NOT DETECTED
CORONAVIRUS OC43 BY PCR: NOT DETECTED
CREAT SERPL-MCNC: 1 MG/DL (ref 0.7–1.2)
EOSINOPHILS ABSOLUTE: 0.06 E9/L (ref 0.05–0.5)
EOSINOPHILS RELATIVE PERCENT: 0.8 % (ref 0–6)
FOLATE: 13 NG/ML (ref 4.8–24.2)
GFR AFRICAN AMERICAN: >60
GFR NON-AFRICAN AMERICAN: >60 ML/MIN/1.73
GLUCOSE BLD-MCNC: 105 MG/DL (ref 74–99)
HCT VFR BLD CALC: 45.1 % (ref 37–54)
HEMOGLOBIN: 15.2 G/DL (ref 12.5–16.5)
HUMAN METAPNEUMOVIRUS BY PCR: NOT DETECTED
HUMAN RHINOVIRUS/ENTEROVIRUS BY PCR: NOT DETECTED
IMMATURE GRANULOCYTES #: 0.01 E9/L
IMMATURE GRANULOCYTES %: 0.1 % (ref 0–5)
INFLUENZA A BY PCR: NOT DETECTED
INFLUENZA B BY PCR: NOT DETECTED
LYMPHOCYTES ABSOLUTE: 2.24 E9/L (ref 1.5–4)
LYMPHOCYTES RELATIVE PERCENT: 30.5 % (ref 20–42)
MCH RBC QN AUTO: 31.1 PG (ref 26–35)
MCHC RBC AUTO-ENTMCNC: 33.7 % (ref 32–34.5)
MCV RBC AUTO: 92.2 FL (ref 80–99.9)
MONOCYTES ABSOLUTE: 0.28 E9/L (ref 0.1–0.95)
MONOCYTES RELATIVE PERCENT: 3.8 % (ref 2–12)
MYCOPLASMA PNEUMONIAE BY PCR: NOT DETECTED
NEUTROPHILS ABSOLUTE: 4.72 E9/L (ref 1.8–7.3)
NEUTROPHILS RELATIVE PERCENT: 64.4 % (ref 43–80)
PARAINFLUENZA VIRUS 1 BY PCR: NOT DETECTED
PARAINFLUENZA VIRUS 2 BY PCR: NOT DETECTED
PARAINFLUENZA VIRUS 3 BY PCR: NOT DETECTED
PARAINFLUENZA VIRUS 4 BY PCR: NOT DETECTED
PDW BLD-RTO: 13.4 FL (ref 11.5–15)
PLATELET # BLD: 203 E9/L (ref 130–450)
PMV BLD AUTO: 9.2 FL (ref 7–12)
POTASSIUM REFLEX MAGNESIUM: 4.2 MMOL/L (ref 3.5–5)
PROCALCITONIN: 0.38 NG/ML (ref 0–0.08)
PROCALCITONIN: 0.42 NG/ML (ref 0–0.08)
RBC # BLD: 4.89 E12/L (ref 3.8–5.8)
RESPIRATORY SYNCYTIAL VIRUS BY PCR: NOT DETECTED
SARS-COV-2, PCR: NOT DETECTED
SODIUM BLD-SCNC: 138 MMOL/L (ref 132–146)
TROPONIN: <0.01 NG/ML (ref 0–0.03)
TROPONIN: <0.01 NG/ML (ref 0–0.03)
VITAMIN B-12: 642 PG/ML (ref 211–946)
WBC # BLD: 7.3 E9/L (ref 4.5–11.5)

## 2020-11-21 PROCEDURE — APPSS30 APP SPLIT SHARED TIME 16-30 MINUTES: Performed by: NURSE PRACTITIONER

## 2020-11-21 PROCEDURE — 84484 ASSAY OF TROPONIN QUANT: CPT

## 2020-11-21 PROCEDURE — 6360000002 HC RX W HCPCS: Performed by: NURSE PRACTITIONER

## 2020-11-21 PROCEDURE — 6360000002 HC RX W HCPCS: Performed by: INTERNAL MEDICINE

## 2020-11-21 PROCEDURE — 6370000000 HC RX 637 (ALT 250 FOR IP): Performed by: INTERNAL MEDICINE

## 2020-11-21 PROCEDURE — 1200000000 HC SEMI PRIVATE

## 2020-11-21 PROCEDURE — 99233 SBSQ HOSP IP/OBS HIGH 50: CPT | Performed by: INTERNAL MEDICINE

## 2020-11-21 PROCEDURE — 82746 ASSAY OF FOLIC ACID SERUM: CPT

## 2020-11-21 PROCEDURE — 84145 PROCALCITONIN (PCT): CPT

## 2020-11-21 PROCEDURE — 94640 AIRWAY INHALATION TREATMENT: CPT

## 2020-11-21 PROCEDURE — 85025 COMPLETE CBC W/AUTO DIFF WBC: CPT

## 2020-11-21 PROCEDURE — 36415 COLL VENOUS BLD VENIPUNCTURE: CPT

## 2020-11-21 PROCEDURE — 6370000000 HC RX 637 (ALT 250 FOR IP): Performed by: NURSE PRACTITIONER

## 2020-11-21 PROCEDURE — 70551 MRI BRAIN STEM W/O DYE: CPT

## 2020-11-21 PROCEDURE — 74018 RADEX ABDOMEN 1 VIEW: CPT

## 2020-11-21 PROCEDURE — 80048 BASIC METABOLIC PNL TOTAL CA: CPT

## 2020-11-21 PROCEDURE — 82607 VITAMIN B-12: CPT

## 2020-11-21 PROCEDURE — 2580000003 HC RX 258: Performed by: NURSE PRACTITIONER

## 2020-11-21 RX ORDER — LORAZEPAM 2 MG/ML
1 INJECTION INTRAMUSCULAR
Status: DISCONTINUED | OUTPATIENT
Start: 2020-11-21 | End: 2020-11-26 | Stop reason: HOSPADM

## 2020-11-21 RX ORDER — LORAZEPAM 2 MG/ML
4 INJECTION INTRAMUSCULAR
Status: DISCONTINUED | OUTPATIENT
Start: 2020-11-21 | End: 2020-11-26 | Stop reason: HOSPADM

## 2020-11-21 RX ORDER — LORAZEPAM 1 MG/1
3 TABLET ORAL
Status: DISCONTINUED | OUTPATIENT
Start: 2020-11-21 | End: 2020-11-26 | Stop reason: HOSPADM

## 2020-11-21 RX ORDER — LORAZEPAM 1 MG/1
1 TABLET ORAL
Status: DISCONTINUED | OUTPATIENT
Start: 2020-11-21 | End: 2020-11-26 | Stop reason: HOSPADM

## 2020-11-21 RX ORDER — ARFORMOTEROL TARTRATE 15 UG/2ML
15 SOLUTION RESPIRATORY (INHALATION) 2 TIMES DAILY
Status: DISCONTINUED | OUTPATIENT
Start: 2020-11-21 | End: 2020-11-26 | Stop reason: HOSPADM

## 2020-11-21 RX ORDER — LORAZEPAM 1 MG/1
2 TABLET ORAL
Status: DISCONTINUED | OUTPATIENT
Start: 2020-11-21 | End: 2020-11-26 | Stop reason: HOSPADM

## 2020-11-21 RX ORDER — SODIUM CHLORIDE 0.9 % (FLUSH) 0.9 %
10 SYRINGE (ML) INJECTION EVERY 12 HOURS SCHEDULED
Status: DISCONTINUED | OUTPATIENT
Start: 2020-11-21 | End: 2020-11-26 | Stop reason: HOSPADM

## 2020-11-21 RX ORDER — THIAMINE MONONITRATE (VIT B1) 100 MG
100 TABLET ORAL DAILY
Status: DISCONTINUED | OUTPATIENT
Start: 2020-11-21 | End: 2020-11-26 | Stop reason: HOSPADM

## 2020-11-21 RX ORDER — LORAZEPAM 2 MG/ML
3 INJECTION INTRAMUSCULAR
Status: DISCONTINUED | OUTPATIENT
Start: 2020-11-21 | End: 2020-11-26 | Stop reason: HOSPADM

## 2020-11-21 RX ORDER — LORAZEPAM 1 MG/1
4 TABLET ORAL
Status: DISCONTINUED | OUTPATIENT
Start: 2020-11-21 | End: 2020-11-26 | Stop reason: HOSPADM

## 2020-11-21 RX ORDER — LORAZEPAM 2 MG/ML
2 INJECTION INTRAMUSCULAR
Status: DISCONTINUED | OUTPATIENT
Start: 2020-11-21 | End: 2020-11-26 | Stop reason: HOSPADM

## 2020-11-21 RX ORDER — M-VIT,TX,IRON,MINS/CALC/FOLIC 27MG-0.4MG
1 TABLET ORAL DAILY
Status: DISCONTINUED | OUTPATIENT
Start: 2020-11-21 | End: 2020-11-26 | Stop reason: HOSPADM

## 2020-11-21 RX ORDER — SODIUM CHLORIDE 0.9 % (FLUSH) 0.9 %
10 SYRINGE (ML) INJECTION PRN
Status: DISCONTINUED | OUTPATIENT
Start: 2020-11-21 | End: 2020-11-26 | Stop reason: HOSPADM

## 2020-11-21 RX ORDER — ATORVASTATIN CALCIUM 40 MG/1
40 TABLET, FILM COATED ORAL NIGHTLY
Status: DISCONTINUED | OUTPATIENT
Start: 2020-11-21 | End: 2020-11-26 | Stop reason: HOSPADM

## 2020-11-21 RX ORDER — BUDESONIDE 0.5 MG/2ML
500 INHALANT ORAL 2 TIMES DAILY
Status: DISCONTINUED | OUTPATIENT
Start: 2020-11-21 | End: 2020-11-26 | Stop reason: HOSPADM

## 2020-11-21 RX ORDER — IPRATROPIUM BROMIDE AND ALBUTEROL SULFATE 2.5; .5 MG/3ML; MG/3ML
1 SOLUTION RESPIRATORY (INHALATION)
Status: DISCONTINUED | OUTPATIENT
Start: 2020-11-21 | End: 2020-11-26 | Stop reason: HOSPADM

## 2020-11-21 RX ORDER — ASPIRIN 81 MG/1
81 TABLET ORAL DAILY
Status: DISCONTINUED | OUTPATIENT
Start: 2020-11-21 | End: 2020-11-26 | Stop reason: HOSPADM

## 2020-11-21 RX ADMIN — ONDANSETRON 4 MG: 2 INJECTION INTRAMUSCULAR; INTRAVENOUS at 11:24

## 2020-11-21 RX ADMIN — AMPICILLIN SODIUM AND SULBACTAM SODIUM 3 G: 2; 1 INJECTION, POWDER, FOR SOLUTION INTRAMUSCULAR; INTRAVENOUS at 14:57

## 2020-11-21 RX ADMIN — SODIUM CHLORIDE, PRESERVATIVE FREE 10 ML: 5 INJECTION INTRAVENOUS at 11:24

## 2020-11-21 RX ADMIN — AMPICILLIN SODIUM AND SULBACTAM SODIUM 3 G: 2; 1 INJECTION, POWDER, FOR SOLUTION INTRAMUSCULAR; INTRAVENOUS at 21:00

## 2020-11-21 RX ADMIN — ASPIRIN 81 MG: 81 TABLET, FILM COATED ORAL at 15:34

## 2020-11-21 RX ADMIN — SODIUM CHLORIDE, PRESERVATIVE FREE 10 ML: 5 INJECTION INTRAVENOUS at 21:01

## 2020-11-21 RX ADMIN — ATORVASTATIN CALCIUM 40 MG: 40 TABLET, FILM COATED ORAL at 21:00

## 2020-11-21 RX ADMIN — IPRATROPIUM BROMIDE AND ALBUTEROL SULFATE 1 AMPULE: .5; 3 SOLUTION RESPIRATORY (INHALATION) at 16:29

## 2020-11-21 ASSESSMENT — PAIN SCALES - GENERAL: PAINLEVEL_OUTOF10: 0

## 2020-11-21 NOTE — ED NOTES
Patient refusing all imaging at this time. Dr. Getachew Berry made aware. No additional orders at this time.      Klaus Dominguez RN  11/20/20 5283

## 2020-11-21 NOTE — ED NOTES
Patient voiced need to move bowels. 2 person assist to restroom. Unsteady gait. Patient assisted to and from restroom without complication. Patient now agreeable to additional labwork. Specimen sent to lab.      Vasyl Brewer RN  11/21/20 2020

## 2020-11-21 NOTE — ED NOTES
Dr. Dee Ornelas at bedside to see patient. Patient had an episode of emesis while being evaluated. Ordered Zofran IV and given.             Tiffany Gillespie RN  11/20/20 1681

## 2020-11-21 NOTE — PROGRESS NOTES
normal  Neck: neck supple and non tender without mass   Pulmonary/Chest:  wheezes, no respiratory distress  Cardiovascular: normal rate, normal S1 and S2   Abdomen: soft, non-tender, non-distended, normal bowel sounds  Extremities: no cyanosis, no clubbing and no edema  Neurologic: no tremor and speech normal      Recent Labs     11/20/20  1640 11/21/20  0515    138   K 4.0 4.2   CL 99 100   CO2 28 26   BUN 14 11   CREATININE 1.0 1.0   GLUCOSE 103* 105*   CALCIUM 9.2 9.7       Recent Labs     11/20/20  1640   ALKPHOS 94   PROT 7.5   LABALBU 4.0   BILITOT 0.3   AST 15   ALT 11       Recent Labs     11/20/20  1640 11/21/20  0515   WBC 6.7 7.3   RBC 4.38 4.89   HGB 13.6 15.2   HCT 40.5 45.1   MCV 92.5 92.2   MCH 31.1 31.1   MCHC 33.6 33.7   RDW 13.5 13.4    203   MPV 8.8 9.2         Radiology:   XR CHEST PORTABLE   Final Result   Interstitial opacities are present bilaterally suggesting peribronchial   inflammatory changes or pulmonary vascular congestion. CTA HEAD W CONTRAST   Final Result   1. No acute intracranial abnormality. 2. Normal, motion degraded, CTA head      XR ABDOMEN (KUB) (SINGLE AP VIEW)    (Results Pending)   MRI BRAIN WO CONTRAST    (Results Pending)       Assessment:  Principal Problem:    Ataxia  Active Problems:    Psychiatric disorder    Pneumonia    Tobacco abuse    Drug abuse ([facility] [address]  Resolved Problems:    * No resolved hospital problems. *      Plan:  1. Ataxia:  Neurology consulted. Possibly secondary to midline cerebellar degeneration, possible fossa stroke. CTA of the head with no acute findings. MRI of the brain ordered. PT/OT. On thiamine. 2. Pneumonia:  Chest xray with opacities. Procalcitonin 0.38. Start Unasyn for possible aspiration pneumonia. Aerosols ordered. 3. Psychiatric disorder:  Patient with hallucinations and reports two other personalities. He was pink slipped in the Emergency Department. He is not currently on any medications. Consult [facility].

## 2020-11-21 NOTE — CARE COORDINATION
SOCIAL WORK / DISCHARGE PLANNING:  Resp panel with COVID negative 11/20. Sw consult noted to consider rehab. Sw did speak with pt at bedside, 1 :1 present as well. Pt did speak with Sw but train of thought is varied doesn't always directly answer question presented to him. Reports to reside with brother and possibly nephew, ambulatory without device, does not drive. He is a , reports 100 % service connected although he does not follow with the [place] as he states \" he didn't like what they say to him, they don't know me. \" and began to ramble about they want to look at your butt and not fix your eyes and teeth. Sw attempted to talk about rehab but he would change subject. Papi had good report with him and encouraged him to consider as he said \" he walks like I have had one too many\". Await PT/OT evals and psych eval. Pt did not give any info to this Sw of involvement with local mental health agencies.                  Electronically signed by MANN Lezama on 11/21/2020 at 6:52 PM

## 2020-11-21 NOTE — ED NOTES
Patient refusing labwork at this time. States he wants to be left alone. This nurse explained that the labwork can be obtained from the IV site. Patient still refusing.      Radha Oliver RN  11/21/20 2373

## 2020-11-21 NOTE — CONSULTS
History Of Present Illness: CHIEF COMPLAINT:  Chest pain     History of Present Illness:  60year old male with a hx of schizoaffective disorder, tobacco and polysubstance abuse. He is able to provide history but this is punctuated by extreme statements therefore reliability is limited. He states that today after getting out of the car with his nephew he developed a substernal pain that is sharp. Associated with headache, dizziness and shortness of breath. No current chest pain or headache but he is not able to state how long it lasted. Right-sided, has been occurring intermittently recently but he is not able to state how long. Does not describe a clear association with exertion. His nephew contacted EMS due to this. The patient's sister was contacted by the ED, apparently this is his baseline mentation. He does not follow with physicians. When seen he denies any complaints. He was got up to ambulate however had significant ataxia and quickly developed vomiting with just standing up. No nystagmus, able to move his head with no symptoms. Reports she smokes a few packs of cigarettes a day. Prior alcohol abuse, labels himself as an alcoholic, but states his last drink was in the 90s. When asked about drug abuse states he is beyond the age where that is a concern but then admits to smoking marijuana earlier today. As above per hospitalist.  Patient is interviewed and speculates that the cause of nausea and vomiting and problems with balance are secondary to \"food poisoning \". The patient is a 60 y.o. male with significant past medical history of see below who presents with above.       The patient has the following symptoms:    Change in level of consciousness: alert    New Weakness: no    Numbness or Tingling: no    Difficulty Swallowing: no    Current Medications:   Scheduled Meds:   sodium chloride flush  10 mL Intravenous 2 times per day    thiamine  100 mg Oral Daily    therapeutic posterior fossa stroke with significant vascular risk factors noted including heavy cigarette smoking. Plan: We will obtain MRI of brain. Psychiatry to see for apparent history of schizoaffective disorder as well as drug abuse cocaine. Require physical therapy. Empiric thiamine.   Thank you for consultation

## 2020-11-21 NOTE — PROCEDURES
Received call out at 9:20pm nov 20 from radiology spoke with nurse patient is refusing. Nov 21 6:45 am patient is still refusing. Cancelling orders due to refusing 2x. If patient changes his mind re submit MRI order.

## 2020-11-21 NOTE — H&P
[facility] History and Physical      CHIEF COMPLAINT:  Chest pain    History of Present Illness:  60year old male with a hx of schizoaffective disorder, tobacco and polysubstance abuse. He is able to provide history but this is punctuated by extreme statements therefore reliability is limited. He states that today after getting out of the car with his nephew he developed a substernal pain that is sharp. Associated with headache, dizziness and shortness of breath. No current chest pain or headache but he is not able to state how long it lasted. Right-sided, has been occurring intermittently recently but he is not able to state how long. Does not describe a clear association with exertion. His nephew contacted EMS due to this. The patient's sister was contacted by the ED, apparently this is his baseline mentation. He does not follow with physicians. When seen he denies any complaints. He was got up to ambulate however had significant ataxia and quickly developed vomiting with just standing up. No nystagmus, able to move his head with no symptoms. Reports she smokes a few packs of cigarettes a day. Prior alcohol abuse, labels himself as an alcoholic, but states his last drink was in the 90s. When asked about drug abuse states he is beyond the age where that is a concern but then admits to smoking marijuana earlier today.     Informant(s) for H&P: Patient    REVIEW OF SYSTEMS:  A comprehensive 14 point review of systems was negative except for: what is in the HPI    PMH:  Past Medical History:   Diagnosis Date    Prostate disorder        Surgical History:  Past Surgical History:   Procedure Laterality Date    WI LAP,SURG,COLECTOMY, PARTIAL, W/ANAST N/A 6/8/2018    ANOSCOPY AND FOREIGN BODY REMOVAL MANUALLY EXTRACTED AND IRRIGATED performed by Coretta Caba MD at [address] DX W/COLLJ SPEC BR/WA IF PFRMD N/A 6/8/2018    [address] 2. Normal, motion degraded, CTA head          EKG: no acute abnl    ASSESSMENT:      Active Problems:    Ataxia  Chest pain  Tobacco abuse  Polysubstance abuse  Hx of alcohol abuse  Schizoaffective disorder: not currently getting treated. PLAN:  Ataxia: no cerebellar sx.   -check vitamin b12/folate, MRI brain  -serial neurologic checks  -PT/OT  -consult neurology    Chest pain: ?cocaine induced  -serial tni, repeat ekg  -may need stress test once above eval is complete    Infiltrates on cxr: has lymphopenia. No other sx to suggest infectious etiology. Will rule out covid, check procalcitonin and bnp. Hold off on antimicrobials at this time. Tobacco abuse:  -nicotine patch    Schizoaffective disorder:  -will need to follow with psych as an outpt    Code Status: full  DVT prophylaxis: lovenox      NOTE: This report was transcribed using voice recognition software. Every effort was made to ensure accuracy; however, inadvertent computerized transcription errors may be present.   Electronically signed by Samantha Pepper MD on 11/20/2020 at 8:56 PM

## 2020-11-22 ENCOUNTER — APPOINTMENT (OUTPATIENT)
Dept: ULTRASOUND IMAGING | Age: 65
DRG: 064 | End: 2020-11-22
Payer: OTHER GOVERNMENT

## 2020-11-22 LAB
ANION GAP SERPL CALCULATED.3IONS-SCNC: 10 MMOL/L (ref 7–16)
BASOPHILS ABSOLUTE: 0.02 E9/L (ref 0–0.2)
BASOPHILS RELATIVE PERCENT: 0.4 % (ref 0–2)
BUN BLDV-MCNC: 13 MG/DL (ref 8–23)
CALCIUM SERPL-MCNC: 9.1 MG/DL (ref 8.6–10.2)
CHLORIDE BLD-SCNC: 99 MMOL/L (ref 98–107)
CO2: 26 MMOL/L (ref 22–29)
CREAT SERPL-MCNC: 1 MG/DL (ref 0.7–1.2)
EKG ATRIAL RATE: 57 BPM
EKG P AXIS: 77 DEGREES
EKG P-R INTERVAL: 174 MS
EKG Q-T INTERVAL: 436 MS
EKG QRS DURATION: 84 MS
EKG QTC CALCULATION (BAZETT): 424 MS
EKG R AXIS: -94 DEGREES
EKG T AXIS: 74 DEGREES
EKG VENTRICULAR RATE: 57 BPM
EOSINOPHILS ABSOLUTE: 0.15 E9/L (ref 0.05–0.5)
EOSINOPHILS RELATIVE PERCENT: 3 % (ref 0–6)
GFR AFRICAN AMERICAN: >60
GFR NON-AFRICAN AMERICAN: >60 ML/MIN/1.73
GLUCOSE BLD-MCNC: 86 MG/DL (ref 74–99)
HCT VFR BLD CALC: 45.5 % (ref 37–54)
HEMOGLOBIN: 15.1 G/DL (ref 12.5–16.5)
IMMATURE GRANULOCYTES #: 0.01 E9/L
IMMATURE GRANULOCYTES %: 0.2 % (ref 0–5)
LYMPHOCYTES ABSOLUTE: 2.51 E9/L (ref 1.5–4)
LYMPHOCYTES RELATIVE PERCENT: 50 % (ref 20–42)
MCH RBC QN AUTO: 30.7 PG (ref 26–35)
MCHC RBC AUTO-ENTMCNC: 33.2 % (ref 32–34.5)
MCV RBC AUTO: 92.5 FL (ref 80–99.9)
MONOCYTES ABSOLUTE: 0.27 E9/L (ref 0.1–0.95)
MONOCYTES RELATIVE PERCENT: 5.4 % (ref 2–12)
NEUTROPHILS ABSOLUTE: 2.06 E9/L (ref 1.8–7.3)
NEUTROPHILS RELATIVE PERCENT: 41 % (ref 43–80)
PDW BLD-RTO: 13.2 FL (ref 11.5–15)
PLATELET # BLD: 210 E9/L (ref 130–450)
PMV BLD AUTO: 8.9 FL (ref 7–12)
POTASSIUM REFLEX MAGNESIUM: 4.2 MMOL/L (ref 3.5–5)
RBC # BLD: 4.92 E12/L (ref 3.8–5.8)
SODIUM BLD-SCNC: 135 MMOL/L (ref 132–146)
URINE CULTURE, ROUTINE: NORMAL
WBC # BLD: 5 E9/L (ref 4.5–11.5)

## 2020-11-22 PROCEDURE — 2580000003 HC RX 258: Performed by: NURSE PRACTITIONER

## 2020-11-22 PROCEDURE — 80048 BASIC METABOLIC PNL TOTAL CA: CPT

## 2020-11-22 PROCEDURE — 94640 AIRWAY INHALATION TREATMENT: CPT

## 2020-11-22 PROCEDURE — 93880 EXTRACRANIAL BILAT STUDY: CPT

## 2020-11-22 PROCEDURE — 97110 THERAPEUTIC EXERCISES: CPT | Performed by: PHYSICAL THERAPIST

## 2020-11-22 PROCEDURE — 6370000000 HC RX 637 (ALT 250 FOR IP): Performed by: NURSE PRACTITIONER

## 2020-11-22 PROCEDURE — 85025 COMPLETE CBC W/AUTO DIFF WBC: CPT

## 2020-11-22 PROCEDURE — 97162 PT EVAL MOD COMPLEX 30 MIN: CPT | Performed by: PHYSICAL THERAPIST

## 2020-11-22 PROCEDURE — 6360000002 HC RX W HCPCS: Performed by: INTERNAL MEDICINE

## 2020-11-22 PROCEDURE — 1200000000 HC SEMI PRIVATE

## 2020-11-22 PROCEDURE — 6360000002 HC RX W HCPCS: Performed by: NURSE PRACTITIONER

## 2020-11-22 PROCEDURE — 97116 GAIT TRAINING THERAPY: CPT | Performed by: PHYSICAL THERAPIST

## 2020-11-22 PROCEDURE — 2580000003 HC RX 258: Performed by: INTERNAL MEDICINE

## 2020-11-22 PROCEDURE — 99233 SBSQ HOSP IP/OBS HIGH 50: CPT | Performed by: NURSE PRACTITIONER

## 2020-11-22 PROCEDURE — 6370000000 HC RX 637 (ALT 250 FOR IP): Performed by: INTERNAL MEDICINE

## 2020-11-22 PROCEDURE — 36415 COLL VENOUS BLD VENIPUNCTURE: CPT

## 2020-11-22 RX ORDER — HYDROCORTISONE 25 MG/G
CREAM TOPICAL 2 TIMES DAILY
Status: DISCONTINUED | OUTPATIENT
Start: 2020-11-22 | End: 2020-11-26 | Stop reason: HOSPADM

## 2020-11-22 RX ORDER — DOCUSATE SODIUM 100 MG/1
100 CAPSULE, LIQUID FILLED ORAL DAILY
Status: DISCONTINUED | OUTPATIENT
Start: 2020-11-22 | End: 2020-11-26 | Stop reason: HOSPADM

## 2020-11-22 RX ADMIN — AMPICILLIN SODIUM AND SULBACTAM SODIUM 3 G: 2; 1 INJECTION, POWDER, FOR SOLUTION INTRAMUSCULAR; INTRAVENOUS at 08:48

## 2020-11-22 RX ADMIN — DOCUSATE SODIUM 100 MG: 100 CAPSULE, LIQUID FILLED ORAL at 11:26

## 2020-11-22 RX ADMIN — IPRATROPIUM BROMIDE AND ALBUTEROL SULFATE 1 AMPULE: .5; 3 SOLUTION RESPIRATORY (INHALATION) at 16:59

## 2020-11-22 RX ADMIN — AMPICILLIN SODIUM AND SULBACTAM SODIUM 3 G: 2; 1 INJECTION, POWDER, FOR SOLUTION INTRAMUSCULAR; INTRAVENOUS at 02:41

## 2020-11-22 RX ADMIN — IPRATROPIUM BROMIDE AND ALBUTEROL SULFATE 1 AMPULE: .5; 3 SOLUTION RESPIRATORY (INHALATION) at 06:07

## 2020-11-22 RX ADMIN — AMPICILLIN SODIUM AND SULBACTAM SODIUM 3 G: 2; 1 INJECTION, POWDER, FOR SOLUTION INTRAMUSCULAR; INTRAVENOUS at 14:52

## 2020-11-22 RX ADMIN — ONDANSETRON 4 MG: 2 INJECTION INTRAMUSCULAR; INTRAVENOUS at 01:07

## 2020-11-22 RX ADMIN — SODIUM CHLORIDE, PRESERVATIVE FREE 10 ML: 5 INJECTION INTRAVENOUS at 08:53

## 2020-11-22 RX ADMIN — IPRATROPIUM BROMIDE AND ALBUTEROL SULFATE 1 AMPULE: .5; 3 SOLUTION RESPIRATORY (INHALATION) at 09:01

## 2020-11-22 RX ADMIN — ARFORMOTEROL TARTRATE 15 MCG: 15 SOLUTION RESPIRATORY (INHALATION) at 06:07

## 2020-11-22 RX ADMIN — ARFORMOTEROL TARTRATE 15 MCG: 15 SOLUTION RESPIRATORY (INHALATION) at 16:59

## 2020-11-22 RX ADMIN — ASPIRIN 81 MG: 81 TABLET, FILM COATED ORAL at 08:49

## 2020-11-22 RX ADMIN — IPRATROPIUM BROMIDE AND ALBUTEROL SULFATE 1 AMPULE: .5; 3 SOLUTION RESPIRATORY (INHALATION) at 14:03

## 2020-11-22 RX ADMIN — THIAMINE HCL TAB 100 MG 100 MG: 100 TAB at 08:49

## 2020-11-22 RX ADMIN — SODIUM CHLORIDE: 9 INJECTION, SOLUTION INTRAVENOUS at 22:54

## 2020-11-22 RX ADMIN — BUDESONIDE 500 MCG: 0.5 INHALANT RESPIRATORY (INHALATION) at 16:59

## 2020-11-22 RX ADMIN — AMPICILLIN SODIUM AND SULBACTAM SODIUM 3 G: 2; 1 INJECTION, POWDER, FOR SOLUTION INTRAMUSCULAR; INTRAVENOUS at 20:59

## 2020-11-22 RX ADMIN — HYDROCORTISONE 2.5%: 25 CREAM TOPICAL at 11:26

## 2020-11-22 RX ADMIN — ENOXAPARIN SODIUM 40 MG: 40 INJECTION SUBCUTANEOUS at 08:48

## 2020-11-22 RX ADMIN — MULTIPLE VITAMINS W/ MINERALS TAB 1 TABLET: TAB at 08:49

## 2020-11-22 RX ADMIN — ATORVASTATIN CALCIUM 40 MG: 40 TABLET, FILM COATED ORAL at 20:59

## 2020-11-22 RX ADMIN — HYDROCORTISONE 2.5%: 25 CREAM TOPICAL at 21:02

## 2020-11-22 RX ADMIN — BUDESONIDE 500 MCG: 0.5 INHALANT RESPIRATORY (INHALATION) at 06:07

## 2020-11-22 RX ADMIN — SODIUM CHLORIDE, PRESERVATIVE FREE 10 ML: 5 INJECTION INTRAVENOUS at 20:59

## 2020-11-22 ASSESSMENT — PAIN SCALES - GENERAL
PAINLEVEL_OUTOF10: 0
PAINLEVEL_OUTOF10: 0

## 2020-11-22 NOTE — PROGRESS NOTES
[address] [place], [place] [address]ist   Progress Note    Admitting Date and Time: 11/20/2020  2:22 PM  Admit Dx: Ataxia [R27.0]  Ataxia [R27.0]    Subjective:    Patient seen and examined. He reports being able to move better today. PT evaluated and recommended Acute Rehab. Jossy spoke with him today and felt that he was at his baseline and discontinued pink slip. ROS: denies fever, chills, cp, sob, n/v, HA unless stated above.      hydrocortisone   Rectal BID    docusate sodium  100 mg Oral Daily    sodium chloride flush  10 mL Intravenous 2 times per day    thiamine  100 mg Oral Daily    therapeutic multivitamin-minerals  1 tablet Oral Daily    ampicillin-sulbactam  3 g Intravenous Q6H    ipratropium-albuterol  1 ampule Inhalation Q4H WA    Arformoterol Tartrate  15 mcg Nebulization BID    budesonide  500 mcg Nebulization BID    aspirin  81 mg Oral Daily    atorvastatin  40 mg Oral Nightly    aspirin  324 mg Oral Once    enoxaparin  40 mg Subcutaneous Daily    ondansetron  4 mg Oral Once     sodium chloride flush, 10 mL, PRN  LORazepam, 1 mg, Q1H PRN    Or  LORazepam, 1 mg, Q1H PRN    Or  LORazepam, 2 mg, Q1H PRN    Or  LORazepam, 2 mg, Q1H PRN    Or  LORazepam, 3 mg, Q1H PRN    Or  LORazepam, 3 mg, Q1H PRN    Or  LORazepam, 4 mg, Q1H PRN    Or  LORazepam, 4 mg, Q1H PRN  acetaminophen, 650 mg, Q6H PRN    Or  acetaminophen, 650 mg, Q6H PRN  polyethylene glycol, 17 g, Daily PRN  ondansetron, 4 mg, Q6H PRN         Objective:    /80   Pulse 80   Temp 98.4 °F (36.9 °C) (Oral)   Resp 16   Wt 160 lb (72.6 kg)   SpO2 95%   BMI 21.70 kg/m²   General Appearance: alert and oriented to person, place and time and in no acute distress  Skin: warm and dry  Head: normocephalic and atraumatic  Eyes: pupils equal, round, and reactive to light, extraocular eye movements intact, conjunctivae normal  Neck: neck supple and non tender without mass   Pulmonary/Chest:  wheezes, no respiratory contralateral collateral flow. Final          Assessment:  Principal Problem:    Ataxia  Active Problems:    Psychiatric disorder    Pneumonia    Tobacco abuse    Drug abuse ([address]    Cerebellar stroke ([address]    Psychosis ([address]  Resolved Problems:    * No resolved hospital problems. *      Plan:  1. Ataxia:  Secondary to right cerebellar stroke and verterbral artery stenosis. PT recommending Acute rehab at discharge. 2. Right cerebellar stroke:  Neurology following. MRI of the brain with findings of acute to subacute right cerebellar stroke, diminutive arterial flow void associated with right vertebral artery suggestive of high-grade stenosis or occlusion at skull base. PT/OT. On thiamine. 3. Pneumonia:  Chest xray with opacities. Procalcitonin 0.38. Continue Unasyn for possible aspiration pneumonia. Aerosols ordered. 4. Psychiatric disorder:  Patient with hallucinations and reports two other personalities. He was pink slipped in the Emergency Department. He is not currently on any medications. [facility] spoke with the patient today and felt that he was at his baseline. Pink slip was discontinued. .   5. Drug abuse:  Urine drug screen was positive for cocaine and marijuana. Cessation education. 6. Tobacco abuse:  Patient declines Nicotine patch. Cessation education. 7. History of ETOH:  CIWA protocol. NOTE: This report was transcribed using voice recognition software. Every effort was made to ensure accuracy; however, inadvertent computerized transcription errors may be present.      Electronically signed by EDMAR Carbajal CNP on 11/22/2020 at 12:30 PM

## 2020-11-22 NOTE — PROGRESS NOTES
S: No new complaints  Objective:      Neuro exam 134/84, pulse 80, he is afebrile  General: awake and alert. Cranial nerve testing was normal.  Funduscopic eye exam revealed not testable. Motor exam: 5-/5. Deep tendon reflexes were absent bilaterally. Plantar responses were flexor bilaterally. Cerebellar exam noted finger to nose without dysmetria. Sensation was normal to joint position sense, light touch and a pin prick . .        Assessment:   Ataxia possibly secondary to midline cerebellar degeneration. Possible posterior fossa stroke with significant vascular risk factors noted including heavy cigarette smoking. MRI of brain positive for right cerebellar stroke with no evidence of mass-effect. Urine toxicology screen positive for cocaine         Plan:                       If carotid us shows significant stenosis then vascular surgery evaluation                    Continue to address all vascular risk factors and he will require physical therapy and possible rehabilatation. Psychiatry to evaluate. No further neurologic test suggested at this time.   Thank you for consultation

## 2020-11-22 NOTE — PROGRESS NOTES
Physical Therapy      [id number]/4429-52    Patient unavailable for physical therapy treatment due to out of room for testing:   ultrasound .

## 2020-11-22 NOTE — PROGRESS NOTES
much help from another person needed to walk in hospital room?: A Lot  How much help from another person for climbing 3-5 steps with a railing?: A Lot  AM-PAC Inpatient Mobility Raw Score : 18  AM-PAC Inpatient T-Scale Score : 43.63  Mobility Inpatient CMS 0-100% Score: 46.58  Mobility Inpatient CMS G-Code Modifier : CK    Nursing cleared patient for PT evaluation. The admitting diagnosis and active problem list as listed above have been reviewed prior to the initiation of this evaluation. OBJECTIVE;   Initial Evaluation  Date: 11/22/20 Treatment Date:     Short Term/ Long Term   Goals   Was pt agreeable to Eval/treatment? Yes    To be met in 3 days   Pain level   0/10        Bed Mobility    Rolling: Independent    Supine to sit:  Independent    Sit to supine: Independent    Scooting: Independent       Transfers Sit to stand: Minimal assist of 1    Sit to stand: Independent     Ambulation    3x75 feet using  hand held assist with Moderate assist of 1   for ataxic gait, scissoring, with poor motor control and foot placement; trial of no upper extremities support and patient required max a with loss of balance x 4    75 feet using  no device with Independent     Stair negotiation: ascended and descended   Not assessed       3 steps with rail  supervision and good foot placement   ROM Within functional limits       Strength BUE:  4+/5  RLE:  4+/5  LLE:  4+/5  coordination impaired L>R with fine motor exam in regards to speed and accuracy    Increase strength in affected mm groups by 1/3 grade   Balance Sitting EOB:  good    Dynamic Standing:  poor   Sitting EOB:  good    Dynamic Standing: fair       Patient is Alert & Oriented x person, place, time and situation and follows directions Short term memory intact  Sensation:  Patient  denies numbness and tingling     Edema:  no    Endurance: fair          Patient education  Patient educated on role of Physical Therapy, risks of immobility, safety and plan of care and risk for fall and need for inpatient rehab to regain independence and cerebellar cva     Patient response to education:   Pt verbalized understanding Pt demonstrated skill Pt requires further education in this area   Yes Partial Yes      Treatment:  Patient practiced and was instructed/facilitated in the following treatment: Patient performed upper and lower extremity reaching and wt shifting activities in standing to challenge balance and accuracy with support to prevent fall  Gait training      Therapeutic Exercises:  see above      At end of session, patient in bed with  call light and phone within reach,   all lines and tubes intact, nursing notified. Patient would benefit from continued skilled Physical Therapy to improve functional independence and quality of life. Patient's/ family goals   home however agreeable to rehab      ASSESSMENT: Patient exhibits decreased strength, balance, coordination impairing functional mobility.  Speed and accuracy of movement impaired and impacting standing activities       Plan of Care:     -Standing Balance: Perform strengthening exercises in standing to promote motor control with or without upper extremity support , Instruct patient on adequate base of support to maintain balance and Challenge balance utilizing reaching  activities beyond center of gravity    -Transfers: Provide instruction on proper hand and foot position for adequate transfer of weight onto lower extremities and use of gait device and Provide stabilization to prevent fall   -Gait: Gait training, Standing activities to improve: base of support, weight shift, weight bearing , Exercises to improve trunk control, Exercises to improve hip and knee control and standing coordination activies   -Endurance: Utilize Supervised activities to increase level of endurance to allow for safe functional mobility including transfers and gait   -Stairs: Stair training with instruction on proper technique and

## 2020-11-23 LAB
ANION GAP SERPL CALCULATED.3IONS-SCNC: 8 MMOL/L (ref 7–16)
BASOPHILS ABSOLUTE: 0.04 E9/L (ref 0–0.2)
BASOPHILS RELATIVE PERCENT: 0.9 % (ref 0–2)
BUN BLDV-MCNC: 11 MG/DL (ref 8–23)
CALCIUM SERPL-MCNC: 9.1 MG/DL (ref 8.6–10.2)
CHLORIDE BLD-SCNC: 100 MMOL/L (ref 98–107)
CO2: 28 MMOL/L (ref 22–29)
CREAT SERPL-MCNC: 1 MG/DL (ref 0.7–1.2)
EOSINOPHILS ABSOLUTE: 0.18 E9/L (ref 0.05–0.5)
EOSINOPHILS RELATIVE PERCENT: 3.8 % (ref 0–6)
GFR AFRICAN AMERICAN: >60
GFR NON-AFRICAN AMERICAN: >60 ML/MIN/1.73
GLUCOSE BLD-MCNC: 113 MG/DL (ref 74–99)
HCT VFR BLD CALC: 43.2 % (ref 37–54)
HEMOGLOBIN: 14.4 G/DL (ref 12.5–16.5)
IMMATURE GRANULOCYTES #: 0 E9/L
IMMATURE GRANULOCYTES %: 0 % (ref 0–5)
LYMPHOCYTES ABSOLUTE: 2.79 E9/L (ref 1.5–4)
LYMPHOCYTES RELATIVE PERCENT: 59.5 % (ref 20–42)
MCH RBC QN AUTO: 30.8 PG (ref 26–35)
MCHC RBC AUTO-ENTMCNC: 33.3 % (ref 32–34.5)
MCV RBC AUTO: 92.5 FL (ref 80–99.9)
MONOCYTES ABSOLUTE: 0.26 E9/L (ref 0.1–0.95)
MONOCYTES RELATIVE PERCENT: 5.5 % (ref 2–12)
NEUTROPHILS ABSOLUTE: 1.42 E9/L (ref 1.8–7.3)
NEUTROPHILS RELATIVE PERCENT: 30.3 % (ref 43–80)
PDW BLD-RTO: 13.3 FL (ref 11.5–15)
PLATELET # BLD: 197 E9/L (ref 130–450)
PMV BLD AUTO: 9 FL (ref 7–12)
POTASSIUM REFLEX MAGNESIUM: 4 MMOL/L (ref 3.5–5)
RBC # BLD: 4.67 E12/L (ref 3.8–5.8)
SODIUM BLD-SCNC: 136 MMOL/L (ref 132–146)
WBC # BLD: 4.7 E9/L (ref 4.5–11.5)

## 2020-11-23 PROCEDURE — 97166 OT EVAL MOD COMPLEX 45 MIN: CPT

## 2020-11-23 PROCEDURE — 97116 GAIT TRAINING THERAPY: CPT

## 2020-11-23 PROCEDURE — 6360000002 HC RX W HCPCS: Performed by: NURSE PRACTITIONER

## 2020-11-23 PROCEDURE — 6360000002 HC RX W HCPCS: Performed by: INTERNAL MEDICINE

## 2020-11-23 PROCEDURE — 2580000003 HC RX 258: Performed by: NURSE PRACTITIONER

## 2020-11-23 PROCEDURE — 1200000000 HC SEMI PRIVATE

## 2020-11-23 PROCEDURE — 6370000000 HC RX 637 (ALT 250 FOR IP): Performed by: INTERNAL MEDICINE

## 2020-11-23 PROCEDURE — 6370000000 HC RX 637 (ALT 250 FOR IP): Performed by: NURSE PRACTITIONER

## 2020-11-23 PROCEDURE — 97535 SELF CARE MNGMENT TRAINING: CPT

## 2020-11-23 PROCEDURE — 99233 SBSQ HOSP IP/OBS HIGH 50: CPT | Performed by: INTERNAL MEDICINE

## 2020-11-23 PROCEDURE — 85025 COMPLETE CBC W/AUTO DIFF WBC: CPT

## 2020-11-23 PROCEDURE — 80048 BASIC METABOLIC PNL TOTAL CA: CPT

## 2020-11-23 PROCEDURE — 94640 AIRWAY INHALATION TREATMENT: CPT

## 2020-11-23 PROCEDURE — 36415 COLL VENOUS BLD VENIPUNCTURE: CPT

## 2020-11-23 PROCEDURE — 2580000003 HC RX 258: Performed by: INTERNAL MEDICINE

## 2020-11-23 RX ADMIN — ARFORMOTEROL TARTRATE 15 MCG: 15 SOLUTION RESPIRATORY (INHALATION) at 17:29

## 2020-11-23 RX ADMIN — IPRATROPIUM BROMIDE AND ALBUTEROL SULFATE 1 AMPULE: .5; 3 SOLUTION RESPIRATORY (INHALATION) at 17:30

## 2020-11-23 RX ADMIN — ATORVASTATIN CALCIUM 40 MG: 40 TABLET, FILM COATED ORAL at 20:51

## 2020-11-23 RX ADMIN — IPRATROPIUM BROMIDE AND ALBUTEROL SULFATE 1 AMPULE: .5; 3 SOLUTION RESPIRATORY (INHALATION) at 06:09

## 2020-11-23 RX ADMIN — DOCUSATE SODIUM 100 MG: 100 CAPSULE, LIQUID FILLED ORAL at 08:50

## 2020-11-23 RX ADMIN — AMPICILLIN SODIUM AND SULBACTAM SODIUM 3 G: 2; 1 INJECTION, POWDER, FOR SOLUTION INTRAMUSCULAR; INTRAVENOUS at 08:50

## 2020-11-23 RX ADMIN — AMPICILLIN SODIUM AND SULBACTAM SODIUM 3 G: 2; 1 INJECTION, POWDER, FOR SOLUTION INTRAMUSCULAR; INTRAVENOUS at 20:50

## 2020-11-23 RX ADMIN — ENOXAPARIN SODIUM 40 MG: 40 INJECTION SUBCUTANEOUS at 08:50

## 2020-11-23 RX ADMIN — IPRATROPIUM BROMIDE AND ALBUTEROL SULFATE 1 AMPULE: .5; 3 SOLUTION RESPIRATORY (INHALATION) at 13:07

## 2020-11-23 RX ADMIN — HYDROCORTISONE 2.5%: 25 CREAM TOPICAL at 08:58

## 2020-11-23 RX ADMIN — BUDESONIDE 500 MCG: 0.5 INHALANT RESPIRATORY (INHALATION) at 17:30

## 2020-11-23 RX ADMIN — SODIUM CHLORIDE, PRESERVATIVE FREE 10 ML: 5 INJECTION INTRAVENOUS at 08:50

## 2020-11-23 RX ADMIN — ASPIRIN 81 MG: 81 TABLET, FILM COATED ORAL at 08:50

## 2020-11-23 RX ADMIN — BUDESONIDE 500 MCG: 0.5 INHALANT RESPIRATORY (INHALATION) at 06:09

## 2020-11-23 RX ADMIN — AMPICILLIN SODIUM AND SULBACTAM SODIUM 3 G: 2; 1 INJECTION, POWDER, FOR SOLUTION INTRAMUSCULAR; INTRAVENOUS at 02:23

## 2020-11-23 RX ADMIN — SODIUM CHLORIDE: 9 INJECTION, SOLUTION INTRAVENOUS at 20:49

## 2020-11-23 RX ADMIN — IPRATROPIUM BROMIDE AND ALBUTEROL SULFATE 1 AMPULE: .5; 3 SOLUTION RESPIRATORY (INHALATION) at 09:08

## 2020-11-23 RX ADMIN — ARFORMOTEROL TARTRATE 15 MCG: 15 SOLUTION RESPIRATORY (INHALATION) at 06:09

## 2020-11-23 RX ADMIN — HYDROCORTISONE 2.5%: 25 CREAM TOPICAL at 20:51

## 2020-11-23 RX ADMIN — MULTIPLE VITAMINS W/ MINERALS TAB 1 TABLET: TAB at 08:50

## 2020-11-23 RX ADMIN — SODIUM CHLORIDE: 9 INJECTION, SOLUTION INTRAVENOUS at 08:50

## 2020-11-23 RX ADMIN — THIAMINE HCL TAB 100 MG 100 MG: 100 TAB at 08:50

## 2020-11-23 RX ADMIN — AMPICILLIN SODIUM AND SULBACTAM SODIUM 3 G: 2; 1 INJECTION, POWDER, FOR SOLUTION INTRAMUSCULAR; INTRAVENOUS at 14:12

## 2020-11-23 RX ADMIN — SODIUM CHLORIDE, PRESERVATIVE FREE 10 ML: 5 INJECTION INTRAVENOUS at 20:51

## 2020-11-23 ASSESSMENT — PAIN SCALES - GENERAL
PAINLEVEL_OUTOF10: 0

## 2020-11-23 NOTE — CARE COORDINATION
11/23/2020 1630 CM note: COVID (-) 11/20/2020. Met with patient at the bedside to discuss discharge planning. Patient states he resides with his brother and nephew and his plan is to return home when medically stable and he does not anticipate any needs. Patient states he is a  is 100% service connected and he is agreeable to us contacting the [place] of his admit. Notified our CM/SW assistant Breanna to notify the [place]. Patient states he does not have a PCP and refuses to let us set him up with a PCP stating \"its not necessary he has God and that's all he needs and he will return to our hospital if he has any further needs\". Pts sister will provide transportation at discharge.  Electronically signed by Dianna Reyes RN on 11/23/2020 at 4:34 PM

## 2020-11-23 NOTE — PROGRESS NOTES
declines to attempt to tomas; reports father assists with LBD at baseline    SBA with use of LB AE   Bathing Mod A  simulated    Min A   Toileting Mod A  simulated    SBA   Bed Mobility  Rolling: independent  Supine to sit: Min A with HOB elevated  Sit to supine: Min A  independent   Functional Transfers Sit to stand: Min A  Stand to sit: Min A  independent   Functional Mobility Min A with hemicane  For side steps [place] [facility], reports discomfort with use of hemicane, proceed with AE per PT recommendations in future sessions  Mod I   Balance Sitting:     Static:  SBA    Dynamic: Min A  Standing: Min A with hemicane     Endurance/Activity Tolerance good     Visual/  Perceptual Glasses: yes              Hand dominance: R  UE ROM: RUE: WFL  LUE: WFL  Strength: RUE: grossly 4-/5 LUE: grossly 3+/5   Strength: WFL, R worse than L  Fine Motor Coordination: WFL    Hearing: WFL  Sensation: No c/o numbness or tingling at present  Tone: WNL  Edema: unremarkable                            Comments: Upon arrival patient supine with HOB slightly elevated. Min to mod cues required for attention to task, patient demo's flights of ideas throughout session. Reports scrotal discomfort with bed mobility. Bed mobility, sitting/standing balance, LB dressing, feeding, short distance functional mobility, and cognition addressed. After session, patient long sitting with all devices within reach, all lines and tubes intact. Bed alarm on. Pt required cues and education as noted above for safe facilitation and completion of tasks. Therapist provided skilled monitoring of patient's response during treatment session. Prior to and at the end of session, environmental modifications/line management completed for patients safety and efficiency of treatment session. Overall, patient demonstrates moderate difficulties with completion of BADLs and IADLs.  Factors contributing to these difficulties include impaired cognition, impaired safety, impaired sitting and standing tolerance, decreased endurance, and generalized weakness. As noted above, patient likely to benefit from further OT intervention to increase independence, safety, and overall quality of life. Patient reports his father assists him with ADL activities, unsure of appropriate assistance father is able to provide- several phone calls made to patient's room throughout session but no sound coming from the other line, patient believes this is his father calling. Treatment:  · Bed mobility: Facilitated bed mobility with cues for proper body mechanics and sequencing to prepare for ADL completion. · Functional transfers: Facilitated transfers from various surfaces with cues for body alignment, safety and hand placement. · ADL completion: Self-care retraining for the above-mentioned ADLs; training on proper hand placement, safety technique, sequencing, and energy conservation techniques. · Postural Balance: Sitting and standing balance retraining to improve righting reactions with postural changes during ADLs. · Cognitive/Perceptual training: retraining exercises to improve attention, mentation, and spatial awareness for ADLs & transfers. · Skilled positioning: Proper positioning to improve interaction with environment, overall functioning and decrease/prevent edema and contractures. Eval Complexity:   · Medium Complexity  · History: Expanded review of medical records and additional review of physical, cognitive, or psychosocial history related to current functional performance  · Exam: 3+ performance deficits  · Assistance/Modification: Min/mod assistance or modifications required to perform tasks. May have comorbidities that affect occupational performance.     Assessment of current deficits   Functional mobility [x]  ADLs [x] Strength [x]  Cognition [x]  Functional transfers  [x] IADLs [x] Safety Awareness [x]  Endurance [x]  Fine Motor Coordination [] Balance [x] Vision/perception [] Sensation []   Gross Motor Coordination [] ROM [x] Delirium []                  Motor Control []    Plan of Care: 1-3 days/week for 1-2 weeks PRN   [x]ADL retraining/adapted techniques and AE recommendations to increase functional independence within precautions                    [x]Energy conservation techniques to improve tolerance for selfcare routine   [x]Functional transfer/mobility training/DME recommendations for increased independence, safety and fall prevention         [x]Patient/family education to increase safety and functional independence             [x]Environmental modifications for safe mobility and completion of ADLs                             [x]Cognitive retraining ex's to improve problem solving skills & safe participation in ADLs/IADLs     []Sensory re-education techniques to improve extremity awareness, maintain skin integrity and improve hand function                             [x]Visual/Perceptual retraining  to improve body awareness and safety during transfers and ADLs  []Splinting/positioning needs to maintain joint/skin integrity and prevent contractures  [x]Therapeutic activity to improve functional performance during ADLs. [x]Therapeutic exercise to improve tolerance and functional strength for ADLs   [x]Balance retraining/tolerance tasks for facilitation of postural control with dynamic challenges during ADLs . []Neuromuscular re-education: facilitation of righting/equilibrium reactions, midline orientation, scapular stability/mobility, Normalization muscle     tone and facilitation active functional movement/Attention                         []Delirium prevention/treatment    [x]Positioning to improve functional independence  []Other:       Rehab Potential:  Good for established goals     Patient / Family Goal: Go home      Patient and/or family were instructed on functional diagnosis, prognosis/goals and OT plan of care.  Demonstrated fair understanding. Eval Complexity:  Medium    Time In: 1028  Time Out: 1056  Total Time: 28 minutes    Min Units   OT Eval Low 14996       OT Eval Medium 97166  X 1   OT Eval High 11264       OT Re-Eval [id number]       Therapeutic Ex 30861       Therapeutic Activities 17437       ADL/Self Care 90743  18 1    Orthotic Management 30324       Neuro Re-Ed 93277       Non-Billable Time          Evaluation Time includes thorough review of current medical information, gathering information on past medical history/social history and prior level of function, completion of standardized testing/informal observation of tasks, assessment of data and education on plan of care and goals.     Mazin Orozco, OTR/L  [id number]

## 2020-11-23 NOTE — PROGRESS NOTES
Physical Therapy    Physical Therapy Initial Evaluation    Room #:  5937/7932-19  Patient Name: Jorge Maria  YOB: 1955  MRN: [id number]    Referring Provider: Beau Mcmullen MD    Date of Service: 11/23/2020    Evaluating Physical Therapist: Candi Hair, PT  #[id number]       Diagnosis:   Ataxia [R27.0]  Ataxia [R27.0] Admitted with right cerebellar stroke     Patient Active Problem List   Diagnosis    Rectal foreign body, initial encounter    Ataxia    Psychiatric disorder    Pneumonia    Tobacco abuse    [facility]    Cerebellar stroke ([facility]    Psychosis ([facility]      Tentative placement recommendation: Subacute rehab or Acute rehab (patient had no preference)  Equipment recommendation: To be determined      Prior Level of Function: Patient ambulated independently    Rehab Potential: good for baseline    Past medical history:   Past Medical History:   Diagnosis Date    Prostate disorder      Past Surgical History:   Procedure Laterality Date    [place] LAP,SURG,COLECTOMY, PARTIAL, W/ANAST N/A 6/8/2018    ANOSCOPY AND FOREIGN BODY REMOVAL MANUALLY EXTRACTED AND IRRIGATED performed by Comfort Helton MD at [address] [facility] FLX DX W/COLLJ [address] BR/WA IF PFRMD N/A 6/8/2018    ANAL PROCTO SIGMOIDOSCOPY FLEXIBLE performed by Marcelino Buenrostro DO at [facility] ENDOSCOPY     Precautions:  Up with assistance, falls, alarm and behavior, hallucinations constant observation    SUBJECTIVE:    Social history: Patient lives with family in a two story home resides first and basement with 3 steps down to enter with [place] [employer] owned: None,       [address]   How much difficulty turning over in bed?: None  How much difficulty sitting down on / standing up from a chair with arms?: A Little  How much difficulty moving from lying on back to sitting on side of bed?: None  How much help from another person moving to and from a bed to a chair?: A Little  How much help from another person needed to walk in hospital room?: A Little  How much help from another person for climbing 3-5 steps with a railing?: A Lot  AM-PAC Inpatient Mobility Raw Score : 19  AM-PAC Inpatient T-Scale Score : 45.44  Mobility Inpatient CMS 0-100% Score: 41.77  Mobility Inpatient CMS G-Code Modifier : CK    Nursing cleared patient for PT treatment. OBJECTIVE;   Initial Evaluation  Date: 11/22/20 Treatment Date:  11/23/2020   Short Term/ Long Term   Goals   Was pt agreeable to Eval/treatment? Yes   Yes To be met in 3 days   Pain level   0/10    0/10    Bed Mobility  Rolling: Independent    Supine to sit: Independent    Sit to supine: Independent    Scooting: Independent   Rolling: Independent   Supine to sit: Independent   Sit to supine: Independent   Scooting: Independent     Transfers Sit to stand: Minimal assist of 1   Sit to stand: Minimal assist of 1; Patient was given instruction for sit to stand transfers regarding weight shifting, sequencing, and proper foot/hand placement to achieve an effective stand with proper knee extension and body mechanics. Sit to stand: Independent     Ambulation    3x75 feet using  hand held assist with Moderate assist of 1   for ataxic gait, scissoring, with poor motor control and foot placement; trial of no upper extremities support and patient required max a with loss of balance x 4  Patient ambulated 75 feet x 2 using wheeled walker with Minimal assist of 1. Verbal cues were given for safety, upright posture, increased base of support, walker management, decreased pace, and obstacle negotiation. Patient would have occasional posterior lean x 4 occurences.   75 feet using  no device with Independent     Stair negotiation: ascended and descended   Not assessed    Not assessed   3 steps with rail  supervision and good foot placement   ROM Within functional limits       Strength BUE:  4+/5  RLE:  4+/5  LLE:  4+/5  coordination impaired L>R with fine motor exam in regards to speed and accuracy    Increase strength in affected mm groups by 1/3 grade   Balance Sitting EOB:  good    Dynamic Standing:  poor  Sitting EOB: good   Dynamic Standing: fair using wheeled walker Sitting EOB:  good    Dynamic Standing: fair       Patient is Alert & Oriented x person, place, time and situation and follows directions; Short term memory intact  Sensation: Patient denies numbness and tingling     Edema:  no    Endurance: fair          Patient education  Patient educated on role of Physical Therapy, risks of immobility, safety and plan of care and risk for fall and need for inpatient rehab to regain independence and cerebellar cva. Patient was educated and facilitated on techniques to increase safety and independence with bed mobility, balance, functional transfers, and functional mobility. Patient response to education:   Pt verbalized understanding Pt demonstrated skill Pt requires further education in this area   Yes Partial Yes      Treatment:  Patient practiced and was instructed/facilitated in the following treatment:  Patient transferred to edge of bed and sat up x 3 minutes to increase dynamic sitting balance and activity tolerance. Patient performed sit to stand transfers and gait training. Patient performed below standing exercises. Patient was returned to bed at end of treatment. Therapeutic Exercises: Patient performed standing squat, calf raise and marching x 10 reps. Verbal cues were given for correct technique and pacing. At end of session, patient in bed with alarm activated, call light and phone within reach, all lines and tubes intact, nursing notified. Patient would benefit from continued skilled Physical Therapy to improve functional independence and quality of life.           Patient's/ family goals   home however agreeable to rehab      ASSESSMENT: Patient not as unsteady today with use of wheeled walker and did not display a scissoring gait compared to yesterday. However, patient still at risk of falls due to impaired coordination, balance and narrow base of support. Speed and accuracy of movement also impaired and impacting standing activities for functional independence. Plan of Care:   -Standing Balance: Perform strengthening exercises in standing to promote motor control with or without upper extremity support , Instruct patient on adequate base of support to maintain balance and Challenge balance utilizing reaching  activities beyond center of gravity    -Transfers: Provide instruction on proper hand and foot position for adequate transfer of weight onto lower extremities and use of gait device and Provide stabilization to prevent fall   -Gait: Gait training, Standing activities to improve: base of support, weight shift, weight bearing , Exercises to improve trunk control, Exercises to improve hip and knee control and standing coordination activies   -Endurance: Utilize Supervised activities to increase level of endurance to allow for safe functional mobility including transfers and gait   -Stairs: Stair training with instruction on proper technique and hand placement on rail    Patient and or family understand(s) diagnosis, prognosis, and plan of care. Frequency of treatments: Patient will be seen daily.       Time in: 1:59  Time out: 2:11    Total Treatment Time: 12 minutes      CPT codes:  Gait Training (41819) 12 minutes 1 unit(s)      Bing Lee PTA   LIC# [id number]

## 2020-11-23 NOTE — PROGRESS NOTES
Physical Therapy    Physical Therapy Initial Evaluation    Room #:  9972/7263-37  Patient Name: Andreas Green  YOB: 1955  MRN: [id number]    Referring Provider: France Olivera MD    Date of Service: 11/23/2020    Evaluating Physical Therapist: Sherri Daigle, PT  #[id number]       Diagnosis:   Ataxia [R27.0]  Ataxia [R27.0] Admitted with right cerebellar stroke     Patient Active Problem List   Diagnosis    Rectal foreign body, initial encounter    Ataxia    Psychiatric disorder    Pneumonia    Tobacco abuse    [facility]    [facility]erebellar stroke ([facility]    Psychosis ([facility]      Tentative placement recommendation: Acute rehab (patient had no preference)  Equipment recommendation: To be determined      Prior Level of Function: Patient ambulated independently    Rehab Potential: good for baseline    Past medical history:   Past Medical History:   Diagnosis Date    Prostate disorder      Past Surgical History:   Procedure Laterality Date    LA LAP,SURG,COLECTOMY, PARTIAL, W/ANAST N/A 6/8/2018    ANOSCOPY AND FOREIGN BODY REMOVAL MANUALLY EXTRACTED AND IRRIGATED performed by Coretta Caba MD at [address] [facility] FLX DX W/COLLJ [address] BR/WA IF PFRMD N/A 6/8/2018    ANAL PROCTO SIGMOIDOSCOPY FLEXIBLE performed by Maryellen Pickard DO at [facility] ENDOSCOPY     Precautions:  Up with assistance, falls, alarm and behavior, hallucinations constant observation    SUBJECTIVE:    Social history: Patient lives with family in a two story home resides first and [facility] with 3 steps down to enter with [place] [employer] owned: None,       [address]   How much difficulty turning over in bed?: None  How much difficulty sitting down on / standing up from a chair with arms?: A Little  How much difficulty moving from lying on back to sitting on side of bed?: None  How much help from another person moving to and from a bed to a chair?: A Little  How much help from another person needed to walk in hospital room?: A Little  How much help from another person for climbing 3-5 steps with a railing?: A Lot  AM-PAC Inpatient Mobility Raw Score : 19  AM-PAC Inpatient T-Scale Score : 45.44  Mobility Inpatient CMS 0-100% Score: 41.77  Mobility Inpatient CMS G-Code Modifier : CK    Nursing cleared patient for PT treatment. OBJECTIVE;   Initial Evaluation  Date: 11/22/20 Treatment Date:  11/23/2020   Short Term/ Long Term   Goals   Was pt agreeable to Eval/treatment? Yes   Yes To be met in 3 days   Pain level   0/10    0/10    Bed Mobility  Rolling: Independent    Supine to sit: Independent    Sit to supine: Independent    Scooting: Independent   Rolling: Independent   Supine to sit: Independent   Sit to supine: Independent   Scooting: Independent     Transfers Sit to stand: Minimal assist of 1   Sit to stand: Minimal assist of 1; Patient was given instruction for sit to stand transfers regarding weight shifting, sequencing, and proper foot/hand placement to achieve an effective stand with proper knee extension and body mechanics. Sit to stand: Independent     Ambulation    3x75 feet using  hand held assist with Moderate assist of 1   for ataxic gait, scissoring, with poor motor control and foot placement; trial of no upper extremities support and patient required max a with loss of balance x 4  Patient ambulated 75 feet x 2 using wheeled walker with Minimal assist of 1. Verbal cues were given for safety, upright posture, increased base of support, walker management, decreased pace, and obstacle negotiation.   75 feet using  no device with Independent     Stair negotiation: ascended and descended   Not assessed    Not assessed   3 steps with rail  supervision and good foot placement   ROM Within functional limits       Strength BUE:  4+/5  RLE:  4+/5  LLE:  4+/5  coordination impaired L>R with fine motor exam in regards to speed and accuracy    Increase strength in affected mm groups by 1/3 grade   Balance Sitting EOB:  good    Dynamic Standing:  poor  Sitting EOB: good   Dynamic Standing: fair using wheeled walker Sitting EOB:  good    Dynamic Standing: fair       Patient is Alert & Oriented x person, place, time and situation and follows directions; Short term memory intact  Sensation: Patient denies numbness and tingling     Edema:  no    Endurance: fair          Patient education  Patient educated on role of Physical Therapy, risks of immobility, safety and plan of care and risk for fall and need for inpatient rehab to regain independence and cerebellar cva. Patient was educated and facilitated on techniques to increase safety and independence with bed mobility, balance, functional transfers, and functional mobility. Patient response to education:   Pt verbalized understanding Pt demonstrated skill Pt requires further education in this area   Yes Partial Yes      Treatment:  Patient practiced and was instructed/facilitated in the following treatment:  Patient transferred to edge of bed and sat up x 5 minutes to increase dynamic sitting balance and activity tolerance. Patient performed sit to stand transfers and gait training. Patient performed below standing exercises. Patient was returned to bed at end of treatment. Therapeutic Exercises: Patient performed standing squat, calf raise and marching x 10 reps. Verbal cues were given for correct technique and pacing. At end of session, patient in bed with alarm activated, call light and phone within reach, all lines and tubes intact, nursing notified. Patient would benefit from continued skilled Physical Therapy to improve functional independence and quality of life. Patient's/ family goals   home however agreeable to rehab      ASSESSMENT: Patient not as unsteady today with use of wheeled walker and did not display a scissoring gait compared to yesterday.  However, patient still at risk of falls due to impaired coordination, balance and narrow base of support. Speed and accuracy of movement also impaired and impacting standing activities. Plan of Care:   -Standing Balance: Perform strengthening exercises in standing to promote motor control with or without upper extremity support , Instruct patient on adequate base of support to maintain balance and Challenge balance utilizing reaching  activities beyond center of gravity    -Transfers: Provide instruction on proper hand and foot position for adequate transfer of weight onto lower extremities and use of gait device and Provide stabilization to prevent fall   -Gait: Gait training, Standing activities to improve: base of support, weight shift, weight bearing , Exercises to improve trunk control, Exercises to improve hip and knee control and standing coordination activies   -Endurance: Utilize Supervised activities to increase level of endurance to allow for safe functional mobility including transfers and gait   -Stairs: Stair training with instruction on proper technique and hand placement on rail    Patient and or family understand(s) diagnosis, prognosis, and plan of care. Frequency of treatments: Patient will be seen daily.       Time in: 8:31  Time out: 8:45    Total Treatment Time: 14 minutes      CPT codes:  Gait Training (13170) 14 minutes 1 unit(s)      Isaías Bailey PTA   LIC# [id number]

## 2020-11-23 NOTE — PLAN OF CARE
Problem: Falls - Risk of:  Goal: Will remain free from falls  Description: Will remain free from falls  11/23/2020 1644 by Wil Kirk RN  Outcome: Met This Shift     Problem: Falls - Risk of:  Goal: Absence of physical injury  Description: Absence of physical injury  11/23/2020 1644 by Wil Kirk RN  Outcome: Met This Shift

## 2020-11-23 NOTE — PROGRESS NOTES
[address] [place], [place] [address]ist   Progress Note    Admitting Date and Time: 11/20/2020  2:22 PM  Admit Dx: Ataxia [R27.0]  Ataxia [R27.0]    Subjective/interval history:    Pt denies any specific complaints today. It is difficult to obtain his subjective thoughts, as he is very tangential at this time. He is agreeable to going to subacute rehab. ROS: denies fever, chills, cp, sob, n/v, HA unless stated above.  hydrocortisone   Rectal BID    docusate sodium  100 mg Oral Daily    sodium chloride flush  10 mL Intravenous 2 times per day    thiamine  100 mg Oral Daily    therapeutic multivitamin-minerals  1 tablet Oral Daily    ampicillin-sulbactam  3 g Intravenous Q6H    ipratropium-albuterol  1 ampule Inhalation Q4H WA    Arformoterol Tartrate  15 mcg Nebulization BID    budesonide  500 mcg Nebulization BID    aspirin  81 mg Oral Daily    atorvastatin  40 mg Oral Nightly    aspirin  324 mg Oral Once    enoxaparin  40 mg Subcutaneous Daily    ondansetron  4 mg Oral Once     sodium chloride flush, 10 mL, PRN  LORazepam, 1 mg, Q1H PRN    Or  LORazepam, 1 mg, Q1H PRN    Or  LORazepam, 2 mg, Q1H PRN    Or  LORazepam, 2 mg, Q1H PRN    Or  LORazepam, 3 mg, Q1H PRN    Or  LORazepam, 3 mg, Q1H PRN    Or  LORazepam, 4 mg, Q1H PRN    Or  LORazepam, 4 mg, Q1H PRN  acetaminophen, 650 mg, Q6H PRN    Or  acetaminophen, 650 mg, Q6H PRN  polyethylene glycol, 17 g, Daily PRN  ondansetron, 4 mg, Q6H PRN         Objective:    BP (!) 160/110   Pulse 94   Temp 99 °F (37.2 °C) (Oral)   Resp 16   Wt 160 lb (72.6 kg)   SpO2 96%   BMI 21.70 kg/m²   General Appearance: alert and oriented to person, place and time and in no acute distress  Skin: warm and dry  Head: normocephalic.   Right ear with congenital deformity noted  Eyes: pupils equal, round, and reactive to light, extraocular eye movements intact, conjunctivae normal  Neck: neck supple and non tender without mass   Pulmonary/Chest: clear to auscultation bilaterally- no wheezes, rales or rhonchi, normal air movement, no respiratory distress  Cardiovascular: normal rate, normal S1 and S2 and no carotid bruits  Abdomen: soft, non-tender, non-distended, normal bowel sounds, no masses or organomegaly  Extremities: no cyanosis, no clubbing and no edema  Neurologic: no cranial nerve deficit and speech normal      Recent Labs     11/21/20  0515 11/22/20  0728 11/23/20  0656    135 136   K 4.2 4.2 4.0    99 100   CO2 26 26 28   BUN 11 13 11   CREATININE 1.0 1.0 1.0   GLUCOSE 105* 86 113*   CALCIUM 9.7 9.1 9.1       No results for input(s): ALKPHOS, PROT, LABALBU, BILITOT, AST, ALT in the last 72 hours. Recent Labs     11/21/20 0515 11/22/20  0728 11/23/20  0656   WBC 7.3 5.0 4.7   RBC 4.89 4.92 4.67   HGB 15.2 15.1 14.4   HCT 45.1 45.5 43.2   MCV 92.2 92.5 92.5   MCH 31.1 30.7 30.8   MCHC 33.7 33.2 33.3   RDW 13.4 13.2 13.3    210 197   MPV 9.2 8.9 9.0       CBC:   Lab Results   Component Value Date    WBC 4.7 11/23/2020    RBC 4.67 11/23/2020    HGB 14.4 11/23/2020    HCT 43.2 11/23/2020    MCV 92.5 11/23/2020    MCH 30.8 11/23/2020    MCHC 33.3 11/23/2020    RDW 13.3 11/23/2020     11/23/2020    MPV 9.0 11/23/2020     BMP:    Lab Results   Component Value Date     11/23/2020    K 4.0 11/23/2020     11/23/2020    CO2 28 11/23/2020    BUN 11 11/23/2020    LABALBU 4.0 11/20/2020    CREATININE 1.0 11/23/2020    CALCIUM 9.1 11/23/2020    GFRAA >60 11/23/2020    LABGLOM >60 11/23/2020    GLUCOSE 113 11/23/2020        Radiology:   US CAROTID ARTERY BILATERAL   Final Result   The right internal carotid artery demonstrates 0-50% stenosis . The left internal carotid artery demonstrates 0-50% stenosis . Bilateral vertebral arteries are patent with flow in the normal direction. XR ABDOMEN (KUB) (SINGLE AP VIEW)   Final Result   No evidence of bowel obstruction. MRI BRAIN WO CONTRAST   Final Result   1.   Acute to abuse:  Urine drug screen was positive for cocaine and marijuana. Cessation education. 6. Tobacco abuse:  Patient declines Nicotine patch. Cessation education. 7. History of ETOH:  CIWA protocol. No signs or symptoms of acute alcohol withdrawal syndrome at this time        NOTE: This report was transcribed using voice recognition software. Every effort was made to ensure accuracy; however, inadvertent computerized transcription errors may be present.      Electronically signed by Eliot Summers DO on 11/23/2020 at 5:20 PM

## 2020-11-24 PROCEDURE — 6370000000 HC RX 637 (ALT 250 FOR IP): Performed by: NURSE PRACTITIONER

## 2020-11-24 PROCEDURE — 6370000000 HC RX 637 (ALT 250 FOR IP): Performed by: INTERNAL MEDICINE

## 2020-11-24 PROCEDURE — 99233 SBSQ HOSP IP/OBS HIGH 50: CPT | Performed by: NURSE PRACTITIONER

## 2020-11-24 PROCEDURE — 97530 THERAPEUTIC ACTIVITIES: CPT

## 2020-11-24 PROCEDURE — 94640 AIRWAY INHALATION TREATMENT: CPT

## 2020-11-24 PROCEDURE — U0003 INFECTIOUS AGENT DETECTION BY NUCLEIC ACID (DNA OR RNA); SEVERE ACUTE RESPIRATORY SYNDROME CORONAVIRUS 2 (SARS-COV-2) (CORONAVIRUS DISEASE [COVID-19]), AMPLIFIED PROBE TECHNIQUE, MAKING USE OF HIGH THROUGHPUT TECHNOLOGIES AS DESCRIBED BY CMS-2020-01-R: HCPCS

## 2020-11-24 PROCEDURE — 2580000003 HC RX 258: Performed by: NURSE PRACTITIONER

## 2020-11-24 PROCEDURE — 6360000002 HC RX W HCPCS: Performed by: NURSE PRACTITIONER

## 2020-11-24 PROCEDURE — 1200000000 HC SEMI PRIVATE

## 2020-11-24 PROCEDURE — 6360000002 HC RX W HCPCS: Performed by: INTERNAL MEDICINE

## 2020-11-24 PROCEDURE — 97110 THERAPEUTIC EXERCISES: CPT

## 2020-11-24 RX ADMIN — ATORVASTATIN CALCIUM 40 MG: 40 TABLET, FILM COATED ORAL at 20:46

## 2020-11-24 RX ADMIN — AMPICILLIN SODIUM AND SULBACTAM SODIUM 3 G: 2; 1 INJECTION, POWDER, FOR SOLUTION INTRAMUSCULAR; INTRAVENOUS at 16:32

## 2020-11-24 RX ADMIN — IPRATROPIUM BROMIDE AND ALBUTEROL SULFATE 1 AMPULE: .5; 3 SOLUTION RESPIRATORY (INHALATION) at 14:41

## 2020-11-24 RX ADMIN — ARFORMOTEROL TARTRATE 15 MCG: 15 SOLUTION RESPIRATORY (INHALATION) at 18:05

## 2020-11-24 RX ADMIN — BUDESONIDE 500 MCG: 0.5 INHALANT RESPIRATORY (INHALATION) at 06:49

## 2020-11-24 RX ADMIN — DOCUSATE SODIUM 100 MG: 100 CAPSULE, LIQUID FILLED ORAL at 08:16

## 2020-11-24 RX ADMIN — HYDROCORTISONE 2.5%: 25 CREAM TOPICAL at 20:47

## 2020-11-24 RX ADMIN — ASPIRIN 81 MG: 81 TABLET, FILM COATED ORAL at 08:16

## 2020-11-24 RX ADMIN — IPRATROPIUM BROMIDE AND ALBUTEROL SULFATE 1 AMPULE: .5; 3 SOLUTION RESPIRATORY (INHALATION) at 10:01

## 2020-11-24 RX ADMIN — SODIUM CHLORIDE, PRESERVATIVE FREE 10 ML: 5 INJECTION INTRAVENOUS at 08:17

## 2020-11-24 RX ADMIN — IPRATROPIUM BROMIDE AND ALBUTEROL SULFATE 1 AMPULE: .5; 3 SOLUTION RESPIRATORY (INHALATION) at 18:05

## 2020-11-24 RX ADMIN — AMPICILLIN SODIUM AND SULBACTAM SODIUM 3 G: 2; 1 INJECTION, POWDER, FOR SOLUTION INTRAMUSCULAR; INTRAVENOUS at 22:01

## 2020-11-24 RX ADMIN — ENOXAPARIN SODIUM 40 MG: 40 INJECTION SUBCUTANEOUS at 08:16

## 2020-11-24 RX ADMIN — BUDESONIDE 500 MCG: 0.5 INHALANT RESPIRATORY (INHALATION) at 18:05

## 2020-11-24 RX ADMIN — IPRATROPIUM BROMIDE AND ALBUTEROL SULFATE 1 AMPULE: .5; 3 SOLUTION RESPIRATORY (INHALATION) at 06:49

## 2020-11-24 RX ADMIN — HYDROCORTISONE 2.5%: 25 CREAM TOPICAL at 08:18

## 2020-11-24 RX ADMIN — SODIUM CHLORIDE, PRESERVATIVE FREE 10 ML: 5 INJECTION INTRAVENOUS at 20:46

## 2020-11-24 RX ADMIN — AMPICILLIN SODIUM AND SULBACTAM SODIUM 3 G: 2; 1 INJECTION, POWDER, FOR SOLUTION INTRAMUSCULAR; INTRAVENOUS at 08:15

## 2020-11-24 RX ADMIN — MULTIPLE VITAMINS W/ MINERALS TAB 1 TABLET: TAB at 08:16

## 2020-11-24 RX ADMIN — ARFORMOTEROL TARTRATE 15 MCG: 15 SOLUTION RESPIRATORY (INHALATION) at 06:49

## 2020-11-24 RX ADMIN — AMPICILLIN SODIUM AND SULBACTAM SODIUM 3 G: 2; 1 INJECTION, POWDER, FOR SOLUTION INTRAMUSCULAR; INTRAVENOUS at 03:20

## 2020-11-24 RX ADMIN — THIAMINE HCL TAB 100 MG 100 MG: 100 TAB at 08:16

## 2020-11-24 ASSESSMENT — PAIN SCALES - GENERAL
PAINLEVEL_OUTOF10: 0
PAINLEVEL_OUTOF10: 0

## 2020-11-24 NOTE — PROGRESS NOTES
OT BEDSIDE TREATMENT NOTE      Date:2020  Patient Name: Lupillo Frances  MRN: [id number]  : 1955  Room: [address] [place] [address]       Evaluating OT: JAY Sequeira/LOC  Referring Provider: Joseph Michael MD      AM-PAC Daily Activity Raw Score: 15/24  Recommended Adaptive Equipment: to be determined; possible needs include ww, BSC      Comments: Based on patient's functional performance as stated below and level of assistance needed prior to admission, this therapist believes that the patient would benefit from subacute rehab following hospital stay in an effort to increase safety, functional independence, and quality of life.     Diagnosis: Ataxia [R27.0]  Ataxia [R27.0]    Pertinent Medical History:   Past Medical History        Past Medical History:   Diagnosis Date    Prostate disorder             Precautions:  Falls, scrotal discomfort, bed alarm     Home Living: Pt lives with father in a 1 story house with 3 step(s) to enter and 1 rail(s); bed/bath on main floor  Bathroom setup: tub shower with safety bar, shower chair  Equipment owned: shower chair     Prior Level of Function: Some assistance with ADLs and with IADLs; using SPC for ambulation. Patient reports utilizing father's SPC. Father assists with LB dressing, driving. Reports an aide was coming to assist with bathing at one point. Driving: no  Occupation: retired/disability from [employer]     Pain Level: no c/o pain at this time  Cognition: A&O: 2-3/4 (reports December for the year); Follows 1-2 step directions. Min cues for redirection, poor attention to task. Perseverating on use of phone in room.               Memory: F              Sequencing: F              Problem solving: F-              Judgement/safety: F-                Functional Assessment:    Initial Eval Status  Date: 20 Treatment Status  Date:2020 STG=LTG (~5-14  days)      Feeding Set up A N/T         Grooming Set up A  seated N/T; pt able to stand sinkside to simulate grooming tasks in bathroom SBA while standing at sink    UB Dressing SBA N/T     independent   LB Dressing Mod A  CGA to doff socks, declines to attempt to tomas; reports father assists with LBD at baseline N/T - pt had donned boots prior to session SBA with use of LB AE   Bathing Mod A  simulated N/T     Min A   Toileting Mod A  Simulated Transfer to/from toilet at supervision/Min A; cuing for hand placement; no AD    SBA   Bed Mobility  Rolling: independent  Supine to sit: Min A with HOB elevated  Sit to supine: Min A Pt seated EOB at beginning and end of session  independent   Functional Transfers Sit to stand: Min A  Stand to sit: Min A Min A for safety with sit to stand transfers to/from EOB; transfer to/from low commode at min A to supervision; cuing for use of hand rail ;  independent   Functional Mobility Min A with hemicane  For side steps toward [facility], reports discomfort with use of hemicane, proceed with AE per PT recommendations in future sessions  Min A with HHA; slightly unsteady gait; pt refused to use walker Mod I   Balance Sitting:     Static:  SBA    Dynamic: Min A  Standing: Min A with hemicane Sitting:     Static:  SBA    Dynamic: Min A  Standing: Min A with HHA     Endurance/Activity Tolerance good fair      Visual/  Perceptual Glasses: yes                      - BUE AROM exercises:  10-15 reps in all planes of movement to increase ROM/endurance required for functional transfers/ADL participation. Exercises completed in shoulder and elbow flexion/extension, internal/external rotation, abduction/adduction, supination/pronation. ROM appears to be [facility] with increased time and visual cuing for follow through. Comments: Patient cleared by nursing staff. Upon arrival pt seated EOB. Pt agreeable to OT tx session. Pt educated with regards to  hand placement, safety awareness, static sitting balance,  standing balance, transfer training, simulated grooming tasks, B UE ROM ex's, ECT's.   At end of session pt seated EOB  with all lines and tubes intact, call light within reach. Overall, pt demonstrated decreased independence and safety during completion of ADL/functional transfers/mobility tasks. Pt would benefit from continued skilled OT to increase safety and independence with completion of ADL/IADL tasks for functional independence and quality of life. Pt required cues and education as noted above for safe facilitation and completion of tasks. Therapist provided skilled monitoring of patient's response during treatment session. Prior to and at the end of session, environmental modifications completed for patients safety and efficiency of treatment session. Overall, patient demonstrates minimal difficulties with completion of BADLs and IADLs. Factors contributing to these difficulties include increased processing time for follow through, unsteadiness, decreased endurance, and generalized weakness. As noted above, patient likely to benefit from further OT intervention to increase independence, safety, and overall quality of life. Treatment as per OT POC:     ? Functional transfers: Facilitated transfers from various surfaces with cues for body alignment, safety and hand placement. ? Postural Balance: Sitting/standing balance retraining to improve righting reactions with postural changes during ADLs. ? Therapeutic Ex's: To increase B UE strength/ROM/endurance required for functional transfers/ADL participation.         · Pt has made fair progress towards set goals      ·  OT 1-3x/week for 5-7 days during hospitalization       Treatment Time In: 11:49 AM     Treatment Time Out: 12:00PM            Treatment Charges: Mins Units   ADL/Home Mgt     [id number]     Thera Activities     [id number] 11 1   Ther Ex                 [id number]     Manual Therapy    91545     Neuro Re-ed         [id number]     Orthotic manage/training                               86311     Non Billable Time     Total Timed Treatment 11 1 Pino Gambino, [facility] Shivam Lipscomb

## 2020-11-24 NOTE — PROGRESS NOTES
Tobacco abuse    Drug abuse ([facility]    Cerebellar stroke ([facility]    Psychosis ([facility]  Resolved Problems:    * No resolved hospital problems. *      Plan:  1. Ataxia:  Secondary to right cerebellar stroke and verterbral artery stenosis. PT recommending Acute rehab at discharge. Patient is agreeable. Spoke with Social Work and Case Management. A referral will be [facility] to [facility]. Will need a [facility] contract that can take 1-5 days. 2. Right cerebellar stroke:  Neurology following. MRI of the brain with findings of acute to subacute right cerebellar stroke, diminutive arterial flow void associated with right vertebral artery suggestive of high-grade stenosis or occlusion at skull base. PT/OT. On thiamine. 3. Pneumonia:  Chest xray with opacities. Procalcitonin 0.38. Continue Unasyn (day #4) for possible aspiration pneumonia. Transition to Augmentin at discharge. 4. Psychiatric disorder:  Patient with hallucinations and reports two other personalities. He was pink slipped in the Emergency Department. He is not currently on any medications. [facility] spoke with the patient and felt that he was at his baseline. Pink slip was discontinued. .   5. Drug abuse:  Urine drug screen was positive for cocaine and marijuana. Cessation education. 6. Tobacco abuse:  Patient declines Nicotine patch. Cessation education. 7. History of ETOH:  CIWA protocol. NOTE: This report was transcribed using voice recognition software. Every effort was made to ensure accuracy; however, inadvertent computerized transcription errors may be present.      Electronically signed by EDMAR Martinez CNP on 11/24/2020 at 11:37 AM

## 2020-11-24 NOTE — CARE COORDINATION
Ss note:11/24/2020. 2:57 PM Covid test neg on 11-. BD Max COVID TEST to be sent today. Aramyue Mckinney spoke with HIRAL Baltazar at [facility]. Pt is NOT service connected ( therefore pt not eligible for [facility] SN[facility].) Per Lisa Baltazar pt was last seen at the [place] in 2016 and she relays pt needs to be seen every 2 yrs to be current with [facility] system. Lisa Baltazar requested clinical info including PT and OT evals be faxed to her to review for POSSIBLE ACUTE REHAB NEEDS. [facility] does not have any SNF rehab beds. All requested information faxed to [facility], will await response for acute rehab. If accepted at [place] acute rehab, pt would require a new covid test result with 72 hours of discharge--test will be sent today.  MANN Downs

## 2020-11-24 NOTE — CARE COORDINATION
Spoke with Yuki at the [place]. Gave her all the patients information and will wait till a nurse calls for clinicals. Nidia Alegre did say since this patient only has VA insurance you NEED to be seen yearly to keep that ins. and he has not. She will still send the notes.  Electronically signed by Juany Yates on 11/24/2020 at 11:44 AM

## 2020-11-24 NOTE — PLAN OF CARE
Problem: Falls - Risk of:  Goal: Will remain free from falls  Description: Will remain free from falls  11/24/2020 1728 by Juli Diamond RN  Outcome: Met This Shift  11/24/2020 0527 by Yoli Gonzáles RN  Outcome: Met This Shift  Goal: Absence of physical injury  Description: Absence of physical injury  11/24/2020 1728 by Juli Diamond RN  Outcome: Met This Shift  11/24/2020 0527 by Yoli Gonzáles RN  Outcome: Met This Shift     Problem: Mobility - Impaired:  Goal: Mobility will improve  Description: Mobility will improve  Outcome: Met This Shift

## 2020-11-24 NOTE — CARE COORDINATION
Ss note:11/24/2020.11:43 AM covid test neg 11-. Per CHAPIS Lucio, pt is receptive to a referral to [facility]. Pt is 100 per service connected per chart review. [facility], she will review. If accepted pt would be permitted to smoke, however pt will be required to have a covid test weekly at the facility per their protocol. Additionally if accepted pt will require [facility] SNF APPROVAL  And a negative covid test 72 hours prior to discharge. SW awaiting return call from Praful at [facility] for acceptance.  MANN Daley

## 2020-11-24 NOTE — PROGRESS NOTES
Physical Therapy    Physical Therapy Treatment Note    Room #:  6706/2267-52  Patient Name: Brenda Villareal  YOB: 1955  MRN: [id number]    Referring Provider: Zackery Agarwal MD    Date of Service: 11/24/2020    Evaluating Physical Therapist: Gianni Reese, PT  #[id number]       Diagnosis:   Ataxia [R27.0]  Ataxia [R27.0] Admitted with right cerebellar stroke     Patient Active Problem List   Diagnosis    Rectal foreign body, initial encounter    Ataxia    Psychiatric disorder    Pneumonia    Tobacco abuse    Drug abuse ([address]    Cerebellar stroke ([address]    Psychosis ([address]      Tentative placement recommendation: Subacute rehab or Acute rehab (patient had no preference)  Equipment recommendation: To be determined      Prior Level of Function: Patient ambulated independently    Rehab Potential: good for baseline    Past medical history:   Past Medical History:   Diagnosis Date    Prostate disorder      Past Surgical History:   Procedure Laterality Date    [place] LAP,SURG,COLECTOMY, PARTIAL, W/ANAST N/A 6/8/2018    ANOSCOPY AND FOREIGN BODY REMOVAL MANUALLY EXTRACTED AND IRRIGATED performed by Vladimir Sears MD at [address] [place] [address] FLX DX W/COLLJ [address] BR/WA IF PFRMD N/A 6/8/2018    ANAL PROCTO SIGMOIDOSCOPY FLEXIBLE performed by Tong Reyes DO at [facility] ENDOSCOPY     Precautions:  Up with assistance, falls, alarm and behavior, pt signed alarm refusal per nursing   SUBJECTIVE:    Social history: Patient lives with family in a two story home resides first and basement with 3 steps down to enter with [place] [employer] owned: None,       [address]   How much difficulty turning over in bed?: None  How much difficulty sitting down on / standing up from a chair with arms?: A Little  How much difficulty moving from lying on back to sitting on side of bed?: None  How much help from another person moving to and from a bed to a chair?: A Little  How much help from another person needed to walk in hospital room?: A Little  How much help from another person for climbing 3-5 steps with a railing?: A Lot  AM-PAC Inpatient Mobility Raw Score : 19  AM-PAC Inpatient T-Scale Score : 45.44  Mobility Inpatient CMS 0-100% Score: 41.77  Mobility Inpatient CMS G-Code Modifier : CK    Nursing cleared patient for PT treatment. Pt pleasantly agrees to therapy    OBJECTIVE;   Initial Evaluation  Date: 11/22/20 Treatment Date:  11/24/2020   Short Term/ Long Term   Goals   Was pt agreeable to Eval/treatment? Yes   Yes To be met in 3 days   Pain level   0/10    0/10    Bed Mobility  Rolling: Independent    Supine to sit: Independent    Sit to supine: Independent    Scooting: Independent   Rolling: Independent   Supine to sit: Independent   Sit to supine: Independent   Scooting: Independent     Transfers Sit to stand: Minimal assist of 1   Sit to stand: Minimal assist of 1;Patient was given instruction for sit to stand transfers regarding weight shifting, sequencing, and proper foot/hand placement to achieve an effective stand with proper knee extension and body mechanics. Sit to stand: Independent     Ambulation    3x75 feet using  hand held assist with Moderate assist of 1   for ataxic gait, scissoring, with poor motor control and foot placement; trial of no upper extremities support and patient required max a with loss of balance x 4  Patient ambulated 60 x 2 using no device with Minimal assist of 1/mod assist . Pt slightly unsteady with swaying with min/mod assist to correct Verbal cues were given for safety, upright posture, increased base of support,  decreased pace, and obstacle negotiation.   75 feet using  no device with Independent     Stair negotiation: ascended and descended   Not assessed    Not assessed   3 steps with rail  supervision and good foot placement   ROM Within functional limits       Strength BUE:  4+/5  RLE:  4+/5  LLE:  4+/5  coordination impaired L>R with fine motor exam in regards to speed and accuracy    Increase strength in affected mm groups by 1/3 grade   Balance Sitting EOB:  good    Dynamic Standing:  poor  Sitting EOB: good   Dynamic Standing: fair without device  Sitting EOB:  good    Dynamic Standing: fair       Patient is Alert & Oriented x person, place, time and situation and follows directions; Short term memory intact  Sensation: Patient denies numbness and tingling     Edema:  no    Endurance: fair          Patient education  Patient educated on role of Physical Therapy, risks of immobility, safety and plan of care and risk for fall and need for inpatient rehab to regain independence and cerebellar cva. Patient was educated and facilitated on techniques to increase safety and independence with bed mobility, balance, functional transfers, and functional mobility. . Patient was explained the the benefits of mobility and risks of immobility. Patient response to education:   Pt verbalized understanding Pt demonstrated skill Pt requires further education in this area   Yes Partial Yes      Treatment:  Patient practiced and was instructed/facilitated in the following treatment:  Patient transferred to edge of bed and sat up x 10 minutes to increase dynamic sitting balance and activity tolerance. Nursing practitioner present to discuss rehab, transition of care. Patient performed sit to stand transfers and gait training. Patient performed below standing exercises. Patient was returned to bed at end of treatment. Therapeutic Exercises: Patient performed standing ankle pumps, long arc quad, seated marching, squat, calf raise, marching and hip abduction/adduction 2 x 10 reps. Verbal cues were given for correct technique and pacing. Seated and standing rests provided. At end of session, patient in bed with alarm refusal signed, call light and phone within reach, all lines and tubes intact, nursing notified.       Patient would benefit from continued skilled Physical Therapy to improve functional independence and quality of life. Patient's/ family goals   home however agreeable to rehab      ASSESSMENT:  Pt with min / mod assist with amb without device. Impaired coordination, Decreased strength and endurance limiting function. At risk for falls cues. Cues for  for increased base of support, slower pace. Speed and accuracy of movement also impaired and impacting standing activities for functional independence. Pt pleasant and motivated to participate and progress. Pt with increased balance ex and challenges this session. Plan of Care:   -Standing Balance: Perform strengthening exercises in standing to promote motor control with or without upper extremity support , Instruct patient on adequate base of support to maintain balance and Challenge balance utilizing reaching  activities beyond center of gravity    -Transfers: Provide instruction on proper hand and foot position for adequate transfer of weight onto lower extremities and use of gait device and Provide stabilization to prevent fall   -Gait: Gait training, Standing activities to improve: base of support, weight shift, weight bearing , Exercises to improve trunk control, Exercises to improve hip and knee control and standing coordination activies   -Endurance: Utilize Supervised activities to increase level of endurance to allow for safe functional mobility including transfers and gait   -Stairs: Stair training with instruction on proper technique and hand placement on rail    Patient and or family understand(s) diagnosis, prognosis, and plan of care. Frequency of treatments: Patient will be seen daily.       Time in: 1110  Time out: 1144    Total Treatment Time: 34 minutes      CPT codes:  Therapeutic activities (30632)   15 minutes  1 unit(s)  Therapeutic exercises (11882)   15 minutes  1 unit(s)  Non billable time 4 minutes      [place], [place]   [address]

## 2020-11-25 VITALS
OXYGEN SATURATION: 99 % | WEIGHT: 160 LBS | DIASTOLIC BLOOD PRESSURE: 105 MMHG | BODY MASS INDEX: 21.7 KG/M2 | HEART RATE: 96 BPM | TEMPERATURE: 98.2 F | SYSTOLIC BLOOD PRESSURE: 168 MMHG | RESPIRATION RATE: 20 BRPM

## 2020-11-25 LAB
ANION GAP SERPL CALCULATED.3IONS-SCNC: 11 MMOL/L (ref 7–16)
BASOPHILS ABSOLUTE: 0.06 E9/L (ref 0–0.2)
BASOPHILS RELATIVE PERCENT: 1 % (ref 0–2)
BUN BLDV-MCNC: 14 MG/DL (ref 8–23)
CALCIUM SERPL-MCNC: 9.6 MG/DL (ref 8.6–10.2)
CHLORIDE BLD-SCNC: 99 MMOL/L (ref 98–107)
CO2: 25 MMOL/L (ref 22–29)
CREAT SERPL-MCNC: 0.9 MG/DL (ref 0.7–1.2)
EOSINOPHILS ABSOLUTE: 0.4 E9/L (ref 0.05–0.5)
EOSINOPHILS RELATIVE PERCENT: 6.5 % (ref 0–6)
GFR AFRICAN AMERICAN: >60
GFR NON-AFRICAN AMERICAN: >60 ML/MIN/1.73
GLUCOSE BLD-MCNC: 111 MG/DL (ref 74–99)
HCT VFR BLD CALC: 45.4 % (ref 37–54)
HEMOGLOBIN: 15.2 G/DL (ref 12.5–16.5)
IMMATURE GRANULOCYTES #: 0 E9/L
IMMATURE GRANULOCYTES %: 0 % (ref 0–5)
LYMPHOCYTES ABSOLUTE: 3.39 E9/L (ref 1.5–4)
LYMPHOCYTES RELATIVE PERCENT: 55.4 % (ref 20–42)
MCH RBC QN AUTO: 30.8 PG (ref 26–35)
MCHC RBC AUTO-ENTMCNC: 33.5 % (ref 32–34.5)
MCV RBC AUTO: 91.9 FL (ref 80–99.9)
MONOCYTES ABSOLUTE: 0.31 E9/L (ref 0.1–0.95)
MONOCYTES RELATIVE PERCENT: 5.1 % (ref 2–12)
NEUTROPHILS ABSOLUTE: 1.96 E9/L (ref 1.8–7.3)
NEUTROPHILS RELATIVE PERCENT: 32 % (ref 43–80)
PDW BLD-RTO: 13.3 FL (ref 11.5–15)
PLATELET # BLD: 215 E9/L (ref 130–450)
PMV BLD AUTO: 9 FL (ref 7–12)
POTASSIUM REFLEX MAGNESIUM: 4.3 MMOL/L (ref 3.5–5)
RBC # BLD: 4.94 E12/L (ref 3.8–5.8)
SARS-COV-2, PCR: NOT DETECTED
SODIUM BLD-SCNC: 135 MMOL/L (ref 132–146)
WBC # BLD: 6.1 E9/L (ref 4.5–11.5)

## 2020-11-25 PROCEDURE — 6360000002 HC RX W HCPCS: Performed by: INTERNAL MEDICINE

## 2020-11-25 PROCEDURE — 80048 BASIC METABOLIC PNL TOTAL CA: CPT

## 2020-11-25 PROCEDURE — 99232 SBSQ HOSP IP/OBS MODERATE 35: CPT | Performed by: INTERNAL MEDICINE

## 2020-11-25 PROCEDURE — 97116 GAIT TRAINING THERAPY: CPT

## 2020-11-25 PROCEDURE — 6370000000 HC RX 637 (ALT 250 FOR IP): Performed by: NURSE PRACTITIONER

## 2020-11-25 PROCEDURE — 2580000003 HC RX 258: Performed by: INTERNAL MEDICINE

## 2020-11-25 PROCEDURE — 6370000000 HC RX 637 (ALT 250 FOR IP): Performed by: INTERNAL MEDICINE

## 2020-11-25 PROCEDURE — 85025 COMPLETE CBC W/AUTO DIFF WBC: CPT

## 2020-11-25 PROCEDURE — 1200000000 HC SEMI PRIVATE

## 2020-11-25 PROCEDURE — 2580000003 HC RX 258: Performed by: NURSE PRACTITIONER

## 2020-11-25 PROCEDURE — 6360000002 HC RX W HCPCS: Performed by: NURSE PRACTITIONER

## 2020-11-25 PROCEDURE — 36415 COLL VENOUS BLD VENIPUNCTURE: CPT

## 2020-11-25 PROCEDURE — 97110 THERAPEUTIC EXERCISES: CPT

## 2020-11-25 RX ADMIN — AMPICILLIN SODIUM AND SULBACTAM SODIUM 3 G: 2; 1 INJECTION, POWDER, FOR SOLUTION INTRAMUSCULAR; INTRAVENOUS at 10:57

## 2020-11-25 RX ADMIN — AMPICILLIN SODIUM AND SULBACTAM SODIUM 3 G: 2; 1 INJECTION, POWDER, FOR SOLUTION INTRAMUSCULAR; INTRAVENOUS at 22:18

## 2020-11-25 RX ADMIN — DOCUSATE SODIUM 100 MG: 100 CAPSULE, LIQUID FILLED ORAL at 09:22

## 2020-11-25 RX ADMIN — ASPIRIN 81 MG: 81 TABLET, FILM COATED ORAL at 09:22

## 2020-11-25 RX ADMIN — SODIUM CHLORIDE, PRESERVATIVE FREE 10 ML: 5 INJECTION INTRAVENOUS at 09:22

## 2020-11-25 RX ADMIN — SODIUM CHLORIDE, PRESERVATIVE FREE 10 ML: 5 INJECTION INTRAVENOUS at 19:57

## 2020-11-25 RX ADMIN — ENOXAPARIN SODIUM 40 MG: 40 INJECTION SUBCUTANEOUS at 09:22

## 2020-11-25 RX ADMIN — MULTIPLE VITAMINS W/ MINERALS TAB 1 TABLET: TAB at 09:21

## 2020-11-25 RX ADMIN — HYDROCORTISONE 2.5%: 25 CREAM TOPICAL at 09:22

## 2020-11-25 RX ADMIN — THIAMINE HCL TAB 100 MG 100 MG: 100 TAB at 09:22

## 2020-11-25 RX ADMIN — ATORVASTATIN CALCIUM 40 MG: 40 TABLET, FILM COATED ORAL at 19:56

## 2020-11-25 RX ADMIN — AMPICILLIN SODIUM AND SULBACTAM SODIUM 3 G: 2; 1 INJECTION, POWDER, FOR SOLUTION INTRAMUSCULAR; INTRAVENOUS at 16:22

## 2020-11-25 ASSESSMENT — PAIN SCALES - GENERAL
PAINLEVEL_OUTOF10: 0
PAINLEVEL_OUTOF10: 0

## 2020-11-25 NOTE — PLAN OF CARE
Problem: Falls - Risk of:  Goal: Will remain free from falls  Description: Will remain free from falls  11/25/2020 0404 by Leo Amador RN  Outcome: Met This Shift     Problem: Falls - Risk of:  Goal: Absence of physical injury  Description: Absence of physical injury  11/25/2020 0404 by Leo Amador RN  Outcome: Met This Shift     Problem: Mobility - Impaired:  Goal: Mobility will improve  Description: Mobility will improve  11/25/2020 0404 by Leo Amador RN  Outcome: Met This Shift

## 2020-11-25 NOTE — CARE COORDINATION
Clinicals were re-faxed to [facility] as the ones sent yesterday were not found.  Electronically signed by Toni Trejo on 11/25/2020 at 2:06 PM

## 2020-11-25 NOTE — PROGRESS NOTES
[address] [place], [place] [address]ist   Progress Note    Admitting Date and Time: 11/20/2020  2:22 PM  Admit Dx: Ataxia [R27.0]  Ataxia [R27.0]    Subjective:    Patient seen and examined. Just finished ambulating with physical therapy. He denies any new or worsening symptoms. He is asking why he needs to have a COVID-19 test prior to going to rehab. I explained to him that this was part of the screening process that subacute rehab requires. ROS: denies fever, chills, cp, sob, n/v, HA unless stated above.      hydrocortisone   Rectal BID    docusate sodium  100 mg Oral Daily    sodium chloride flush  10 mL Intravenous 2 times per day    thiamine  100 mg Oral Daily    therapeutic multivitamin-minerals  1 tablet Oral Daily    ampicillin-sulbactam  3 g Intravenous Q6H    ipratropium-albuterol  1 ampule Inhalation Q4H [place]    Arformoterol Tartrate  15 mcg Nebulization BID    budesonide  500 mcg Nebulization BID    aspirin  81 mg Oral Daily    atorvastatin  40 mg Oral Nightly    aspirin  324 mg Oral Once    enoxaparin  40 mg Subcutaneous Daily    ondansetron  4 mg Oral Once     sodium chloride flush, 10 mL, PRN  LORazepam, 1 mg, Q1H PRN    Or  LORazepam, 1 mg, Q1H PRN    Or  LORazepam, 2 mg, Q1H PRN    Or  LORazepam, 2 mg, Q1H PRN    Or  LORazepam, 3 mg, Q1H PRN    Or  LORazepam, 3 mg, Q1H PRN    Or  LORazepam, 4 mg, Q1H PRN    Or  LORazepam, 4 mg, Q1H PRN  acetaminophen, 650 mg, Q6H PRN    Or  acetaminophen, 650 mg, Q6H PRN  polyethylene glycol, 17 g, Daily PRN  ondansetron, 4 mg, Q6H PRN         Objective:    BP (!) 147/84   Pulse 105   Temp 98.5 °F (36.9 °C) (Oral)   Resp 16   Wt 160 lb (72.6 kg)   SpO2 97%   BMI 21.70 kg/m²   General Appearance: alert and oriented to person, place and time and in no acute distress  Skin: warm and dry  Head: normocephalic and atraumatic  Eyes: pupils equal, round, and reactive to light, extraocular eye movements intact, conjunctivae normal  Neck: neck supple and non tender without mass   Pulmonary/Chest:  diminished but otherwise clear bilaterally. Nonlabored. Cardiovascular: normal rate, normal S1 and S2   Abdomen: soft, non-tender, non-distended, normal bowel sounds  Extremities: no cyanosis, no clubbing and no edema  Neurologic: no tremor and speech normal  Psych: Normal affect, improved from prior exam      Recent Labs     11/23/20  0656 11/25/20  1427    135   K 4.0 4.3    99   CO2 28 25   BUN 11 14   CREATININE 1.0 0.9   GLUCOSE 113* 111*   CALCIUM 9.1 9.6       No results for input(s): ALKPHOS, PROT, LABALBU, BILITOT, AST, ALT in the last 72 hours. Recent Labs     11/23/20  0656 11/25/20  1427   WBC 4.7 6.1   RBC 4.67 4.94   HGB 14.4 15.2   HCT 43.2 45.4   MCV 92.5 91.9   MCH 30.8 30.8   MCHC 33.3 33.5   RDW 13.3 13.3    215   MPV 9.0 9.0         Radiology:   US CAROTID ARTERY BILATERAL   Final Result   The right internal carotid artery demonstrates 0-50% stenosis . The left internal carotid artery demonstrates 0-50% stenosis . Bilateral vertebral arteries are patent with flow in the normal direction. XR ABDOMEN (KUB) (SINGLE AP VIEW)   Final Result   No evidence of bowel obstruction. MRI BRAIN WO CONTRAST   Final Result   1. Acute to subacute right cerebellar stroke. 2.  Moderate burden of nonspecific foci of abnormal signal throughout white   matter suggestive of chronic microvascular ischemia. 3.  Diminutive arterial flow void associated with right vertebral artery   suggestive of high-grade stenosis or occlusion at skull base. XR CHEST PORTABLE   Final Result   Interstitial opacities are present bilaterally suggesting peribronchial   inflammatory changes or pulmonary vascular congestion. CTA HEAD W CONTRAST   Final Result   Addendum 1 of 1   ADDENDUM:   The visualized cervical portion of the right vertebral artery V3 segment    is   occluded.   There is reconstitution at the artery enters the dura at the V4   segment, likely from contralateral collateral flow. Final          Assessment:  Principal Problem:    Ataxia  Active Problems:    Psychiatric disorder    Pneumonia    Tobacco abuse    Drug abuse ([address]    Cerebellar stroke ([facility]    Psychosis ([facility]  Resolved Problems:    * No resolved hospital problems. *      Plan:  1. Ataxia:  Secondary to right cerebellar stroke and verterbral artery stenosis. PT recommending Acute rehab at discharge. 2. Right cerebellar stroke:  Neurology following. MRI of the brain with findings of acute to subacute right cerebellar stroke, diminutive arterial flow void associated with right vertebral artery suggestive of high-grade stenosis or occlusion at skull base.  PT/OT.    On aspirin and statin. Blood pressure control  3. Pneumonia:  Chest xray with opacities. Procalcitonin 0.38.  Continue Unasyn for possible aspiration pneumonia. Aerosols ordered. Continue Unasyn to complete 5 days total of antibiotics, then discontinue. 4. Psychiatric disorder:  Patient with hallucinations and reports two other personalities. [facility] was pink slipped in the Emergency Department. [facility] is not currently on any medications.  [facility] spoke with the patient today and felt that he was at his baseline.  Pink slip was discontinued. 5. Drug abuse:  Urine drug screen was positive for cocaine and marijuana.  Cessation education. 6. Tobacco abuse:  Patient declines Nicotine patch.  Cessation education. 7. History of ETOH:  CIWA protocol.    No signs or symptoms of acute alcohol withdrawal syndrome at this time     Disposition: Medically stable for discharge. Awaiting placement. NOTE: This report was transcribed using voice recognition software. Every effort was made to ensure accuracy; however, inadvertent computerized transcription errors may be present.      Electronically signed by [employer] on 11/25/2020 at 3:27 PM

## 2020-11-25 NOTE — CARE COORDINATION
Ss note:11/25/2020.3:11 PM Covid test result negative on 11-. CM left 3 vm [facility] for [facility] if [facility] to [place] [address]. No calls have been returned to  on this matter. Sarah[place], Public  has screened pt for medicaid eligibility, pt is medicaid pending [id number]. Several SNF referral made without success for pending medicaid. At this time a referral is out to [facility], awaiting response. If accepted, NO PRECERT, ss would need to complete a HENS.   MANN Hawkins

## 2020-11-25 NOTE — PLAN OF CARE
Problem: Falls - Risk of:  Goal: Will remain free from falls  Description: Will remain free from falls  11/25/2020 1203 by Agnes Anderson RN  Outcome: Met This Shift     Problem: Falls - Risk of:  Goal: Absence of physical injury  Description: Absence of physical injury  11/25/2020 1203 by Agnes Anderson RN  Outcome: Met This Shift     Problem: Mobility - Impaired:  Goal: Mobility will improve  Description: Mobility will improve  11/25/2020 1203 by Agnes Anderson RN  Outcome: Met This Shift

## 2020-11-25 NOTE — PROGRESS NOTES
help from another person needed to walk in hospital room?: A Little  How much help from another person for climbing 3-5 steps with a railing?: A Little  AM-PAC Inpatient Mobility Raw Score : 20  AM-PAC Inpatient T-Scale Score : 47.67  Mobility Inpatient CMS 0-100% Score: 35.83  Mobility Inpatient CMS G-Code Modifier : [address] cleared patient for PT treatment. OBJECTIVE;   Initial Evaluation  Date: 11/22/20 Treatment Date:  11/25/2020   Short Term/ Long Term   Goals   Was pt agreeable to Eval/treatment? Yes   Yes To be met in 3 days   Pain level   0/10    0/10    Bed Mobility  Rolling: Independent    Supine to sit: Independent    Sit to supine: Independent    Scooting: Independent   Rolling: Independent   Supine to sit: Independent   Sit to supine: Independent   Scooting: Independent     Transfers Sit to stand: Minimal assist of 1   Sit to stand: Minimal assist of 1;Patient was given instruction for sit to stand transfers regarding weight shifting, sequencing, and proper foot/hand placement to achieve an effective stand with proper knee extension and body mechanics. Sit to stand: Independent     Ambulation    3x75 feet using  hand held assist with Moderate assist of 1   for ataxic gait, scissoring, with poor motor control and foot placement; trial of no upper extremities support and patient required max a with loss of balance x 4  Patient ambulated 60 x 2 using wheeled walker with Minimal assist of 1. Verbal cues were given for safety, upright posture, increased base of support, and decreased pace.   75 feet using  no device with Independent     Stair negotiation: ascended and descended   Not assessed    Not assessed   3 steps with rail  supervision and good foot placement   ROM Within functional limits       Strength BUE:  4+/5  RLE:  4+/5  LLE:  4+/5  coordination impaired L>R with fine motor exam in regards to speed and accuracy    Increase strength in affected mm groups by 1/3 grade   Balance Sitting EOB:  good    Dynamic Standing:  poor  Sitting EOB: good   Dynamic Standing: fair due to occasional posterior lean Sitting EOB:  good    Dynamic Standing: fair       Patient education  Patient educated on role of Physical Therapy, risks of immobility, safety and plan of care and risk for fall and need for inpatient rehab to regain independence and cerebellar cva. Patient was educated and facilitated on techniques to increase safety and independence with bed mobility, balance, functional transfers, and functional mobility. . Patient was explained the the benefits of mobility and risks of immobility. Patient response to education:   Pt verbalized understanding Pt demonstrated skill Pt requires further education in this area   Yes Partial Yes      Treatment:  Patient practiced and was instructed/facilitated in the following treatment:  Patient transferred to edge of bed and sat up x 5 minutes to increase dynamic sitting balance and activity tolerance. Patient performed sit to stand transfers and gait training. Patient performed below exercises. Patient was returned to bed at end of treatment. Therapeutic Exercises: Patient performed ankle pumps, long arc quad and seated marching 2 x 10 reps. Verbal cues were given for correct technique and pacing. Patient denied standing exercises due to increased tingling in feet after ambulation. At end of session, patient in bed with alarm refusal signed, call light and phone within reach, all lines and tubes intact, nursing notified. Patient would benefit from continued skilled Physical Therapy to improve functional independence and quality of life. Patient's/ family goals   home however agreeable to rehab      ASSESSMENT:  Patient displayed improved balance and safety during ambulation when using wheeled walker. However, patient continues to require cues for safety and pacing.  Patient displayed posterior lean and unsteadiness when distracted or not focusing on task at hand. Speed and accuracy of movement also impaired and impacting standing activities for functional independence. Patient pleasant and motivated to participate and progress. Plan of Care:   -Standing Balance: Perform strengthening exercises in standing to promote motor control with or without upper extremity support , Instruct patient on adequate base of support to maintain balance and Challenge balance utilizing reaching  activities beyond center of gravity    -Transfers: Provide instruction on proper hand and foot position for adequate transfer of weight onto lower extremities and use of gait device and Provide stabilization to prevent fall   -Gait: Gait training, Standing activities to improve: base of support, weight shift, weight bearing , Exercises to improve trunk control, Exercises to improve hip and knee control and standing coordination activies   -Endurance: Utilize Supervised activities to increase level of endurance to allow for safe functional mobility including transfers and gait   -Stairs: Stair training with instruction on proper technique and hand placement on rail    Patient and or family understand(s) diagnosis, prognosis, and plan of care. Frequency of treatments: Patient will be seen daily.       Time in: 8:37  Time out: 9:21    Total Treatment Time: 24 minutes      CPT codes:  Therapeutic exercises (38232)   12 minutes  1 unit(s)  Gait Training (28752) 12 minutes 1 unit(s)    Tona Turner PTA   LIC# [id number]

## 2020-11-25 NOTE — PROGRESS NOTES
Physical Therapy    Physical Therapy Treatment Note    Room #:  2058/3823-62  Patient Name: Calderon Amezquita  YOB: 1955  MRN: [id number]    Referring Provider: Daren Jensen MD    Date of Service: 11/25/2020    Evaluating Physical Therapist: Abhi Reyes, PT  #[id number]       Diagnosis:   Ataxia [R27.0]  Ataxia [R27.0] Admitted with right cerebellar stroke     Patient Active Problem List   Diagnosis    Rectal foreign body, initial encounter    Ataxia    Psychiatric disorder    Pneumonia    Tobacco abuse    [facility] ([facility]    Cerebellar stroke ([facility]    Psychosis ([facility]      Tentative placement recommendation: Subacute rehab or Acute rehab (patient had no preference)  Equipment recommendation: To be determined      Prior Level of Function: Patient ambulated independently    Rehab Potential: good for baseline    Past medical history:   Past Medical History:   Diagnosis Date    Prostate disorder      Past Surgical History:   Procedure Laterality Date    CO LAP,SURG,COLECTOMY, PARTIAL, W/ANAST N/A 6/8/2018    ANOSCOPY AND FOREIGN BODY REMOVAL MANUALLY EXTRACTED AND IRRIGATED performed by Judy Cho MD at [address] [place] [address] FLX DX W/COLLJ [address] BR/WA IF PFRMD N/A 6/8/2018    ANAL PROCTO SIGMOIDOSCOPY FLEXIBLE performed by Blessing Weiss DO at [facility] ENDOSCOPY     Precautions:  Up with assistance, falls, alarm and behavior, patient signed alarm refusal per nursing   SUBJECTIVE:    Social history: Patient lives with family in a two story home resides first and basement with 3 steps down to enter with [place] [employer] owned: None,       [address]   How much difficulty turning over in bed?: None  How much difficulty sitting down on / standing up from a chair with arms?: A Little  How much difficulty moving from lying on back to sitting on side of bed?: None  How much help from another person moving to and from a bed to a chair?: A Little  How much help from another person needed to walk in hospital room?: A Little  How much help from another person for climbing 3-5 steps with a railing?: A Little  AM-PAC Inpatient Mobility Raw Score : 20  AM-PAC Inpatient T-Scale Score : 47.67  Mobility Inpatient CMS 0-100% Score: 35.83  Mobility Inpatient CMS G-Code Modifier : [address] cleared patient for PT treatment. OBJECTIVE;   Initial Evaluation  Date: 11/22/20 Treatment Date:  11/25/2020   Short Term/ Long Term   Goals   Was pt agreeable to Eval/treatment? Yes   Yes To be met in 3 days   Pain level   0/10    0/10    Bed Mobility  Rolling: Independent    Supine to sit: Independent    Sit to supine: Independent    Scooting: Independent   Rolling: Independent   Supine to sit: Independent   Sit to supine: Independent   Scooting: Independent     Transfers Sit to stand: Minimal assist of 1   Sit to stand: Minimal assist of 1;Patient was given instruction for sit to stand transfers regarding weight shifting, sequencing, and proper foot/hand placement to achieve an effective stand with proper knee extension and body mechanics. Sit to stand: Independent     Ambulation    3x75 feet using  hand held assist with Moderate assist of 1   for ataxic gait, scissoring, with poor motor control and foot placement; trial of no upper extremities support and patient required max a with loss of balance x 4  Patient ambulated 60 x 2 using wheeled walker with Minimal assist of 1. Verbal cues were given for safety, upright posture, increased base of support, and decreased pace.   75 feet using  no device with Independent     Stair negotiation: ascended and descended   Not assessed    Not assessed   3 steps with rail  supervision and good foot placement   ROM Within functional limits       Strength BUE:  4+/5  RLE:  4+/5  LLE:  4+/5  coordination impaired L>R with fine motor exam in regards to speed and accuracy    Increase strength in affected mm groups by 1/3 grade   Balance Sitting EOB:  good    Dynamic Standing:  poor  Sitting EOB: good   Dynamic Standing: fair due to occasional posterior lean Sitting EOB:  good    Dynamic Standing: fair       Patient education  Patient educated on role of Physical Therapy, risks of immobility, safety and plan of care and risk for fall and need for inpatient rehab to regain independence and cerebellar cva. Patient was educated and facilitated on techniques to increase safety and independence with bed mobility, balance, functional transfers, and functional mobility. . Patient was explained the the benefits of mobility and risks of immobility. Patient response to education:   Pt verbalized understanding Pt demonstrated skill Pt requires further education in this area   Yes Partial Yes      Treatment:  Patient practiced and was instructed/facilitated in the following treatment:  Patient transferred to edge of bed and sat up x 8 minutes to increase dynamic sitting balance and activity tolerance. Patient performed sit to stand transfers and gait training. Patient was returned to bed at end of treatment. Therapeutic Exercises: not performed    At end of session, patient in bed with alarm refusal signed, call light and phone within reach, all lines and tubes intact, nursing notified. Patient would benefit from continued skilled Physical Therapy to improve functional independence and quality of life. Patient's/ family goals   home however agreeable to rehab      ASSESSMENT:  Patient displayed improved balance and safety during ambulation when using wheeled walker. However, patient continues to require cues for safety and pacing. Patient displayed posterior lean and unsteadiness when distracted or not focusing on task at hand where he had 1 LOB to the right where he required moderate assist to recover. Speed and accuracy of movement also impaired and impacting standing activities for functional independence.  Patient pleasant and motivated to participate and progress. Plan of Care:   -Standing Balance: Perform strengthening exercises in standing to promote motor control with or without upper extremity support , Instruct patient on adequate base of support to maintain balance and Challenge balance utilizing reaching  activities beyond center of gravity    -Transfers: Provide instruction on proper hand and foot position for adequate transfer of weight onto lower extremities and use of gait device and Provide stabilization to prevent fall   -Gait: Gait training, Standing activities to improve: base of support, weight shift, weight bearing , Exercises to improve trunk control, Exercises to improve hip and knee control and standing coordination activies   -Endurance: Utilize Supervised activities to increase level of endurance to allow for safe functional mobility including transfers and gait   -Stairs: Stair training with instruction on proper technique and hand placement on rail    Patient and or family understand(s) diagnosis, prognosis, and plan of care. Frequency of treatments: Patient will be seen daily.       Time in: 1:28  Time out: 1:52    Total Treatment Time: 24 minutes      CPT codes:  Gait Training (53771) 24 minutes 2 unit(s)    Daryl Blas PTA   LIC# [id number]

## 2020-11-26 PROCEDURE — 99239 HOSP IP/OBS DSCHRG MGMT >30: CPT | Performed by: INTERNAL MEDICINE

## 2020-11-26 RX ORDER — ATORVASTATIN CALCIUM 40 MG/1
40 TABLET, FILM COATED ORAL NIGHTLY
Qty: 30 TABLET | Refills: 0 | Status: SHIPPED | OUTPATIENT
Start: 2020-11-26

## 2020-11-26 RX ORDER — ALBUTEROL SULFATE 90 UG/1
2 AEROSOL, METERED RESPIRATORY (INHALATION) 4 TIMES DAILY PRN
Qty: 1 INHALER | Refills: 0 | Status: SHIPPED | OUTPATIENT
Start: 2020-11-26

## 2020-11-26 RX ORDER — ASPIRIN 81 MG/1
81 TABLET ORAL DAILY
Qty: 30 TABLET | Refills: 0 | Status: SHIPPED | OUTPATIENT
Start: 2020-11-26

## 2020-11-26 RX ORDER — BUDESONIDE AND FORMOTEROL FUMARATE DIHYDRATE 160; 4.5 UG/1; UG/1
2 AEROSOL RESPIRATORY (INHALATION) 2 TIMES DAILY
Qty: 1 INHALER | Refills: 0 | Status: SHIPPED | OUTPATIENT
Start: 2020-11-26

## 2020-11-26 NOTE — PLAN OF CARE
Problem: Falls - Risk of:  Goal: Will remain free from falls  Description: Will remain free from falls  Outcome: Met This Shift  Goal: Absence of physical injury  Description: Absence of physical injury  Outcome: Met This Shift     Problem: Mobility - Impaired:  Goal: Mobility will improve  Description: Mobility will improve  Outcome: Met This Shift

## 2020-11-26 NOTE — DISCHARGE SUMMARY
[facility] Physician Discharge Summary       primary care   Patient provided with list of PCPs in area           Activity level: Resume normal activity. Do not drive until assessed by primary care physician. Diet: DIET GENERAL;    Labs: Routine labs per primary care physician. Condition at discharge: Stable    Dispo: Home        Patient ID:  Lupillo Frances  [id number]  19 y.o.  1955    Admit date: 11/20/2020    Discharge date and time:  11/26/2020  9:28 AM    Admission Diagnoses: Principal Problem:    Ataxia  Active Problems:    Psychiatric disorder    Pneumonia    Tobacco abuse    Drug abuse ([address]    Cerebellar stroke ([address]    Psychosis ([address]  Resolved Problems:    * No resolved hospital problems. *      Discharge Diagnoses: Principal Problem:    Ataxia  Active Problems:    Psychiatric disorder    Pneumonia    Tobacco abuse    Drug abuse ([address]    Cerebellar stroke ([address]    Psychosis ([address]  Resolved Problems:    * No resolved hospital problems. *      Consults:  IP CONSULT TO NEUROLOGY  IP CONSULT TO PSYCHIATRY  IP CONSULT TO SOCIAL WORK    Procedures: None    Hospital Course: This is a 71-year-old male that was admitted to the hospital with ataxic gait. 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 Urine toxicology positive for cannabinoid and cocaine. Patient was treated with aspirin, statin, and physical therapy. Patient also has a history of psychiatric disorders, and is well-known to the psychiatry service here. Psychiatry was consulted, and patient appeared to be at baseline with no indications for psychotropic medications during this hospitalization.     Initial plan for discharge was to go to rehab through [place], however on morning of 11/26 patient became very upset that he was still hospitalized and decided he would like to be discharged home. He was stable for discharged home, and was provided a list of primary care physicians to follow-up with. Also to note, patient became combative several times, including overnight 11/25 and the morning of 11/26. Cassie duarte called morning of 11/26. I attempted to speak with the patient on the morning of 11/26, and he did not want to discuss discharge instructions. He stated \"just get out of here, I'm going to get dressed and you do not need to watch me get dressed\". Discharge Exam:  Vitals:    11/25/20 0920 11/25/20 1615 11/25/20 1945 11/25/20 2000   BP: (!) 147/84 (!) 169/101  (!) 168/105   Pulse: 105 91  96   Resp: 16 18  20   Temp: 98.5 °F (36.9 °C) 99 °F (37.2 °C)  98.2 °F (36.8 °C)   TempSrc: Oral Oral Oral Oral   SpO2: 97% 98%  99%   Weight:           General Appearance: alert and oriented to person, place and time, very angry but demonstrates capacity and is oriented. Skin: warm and dry  Head: normocephalic and atraumatic  Eyes: pupils equal, round, and reactive to light, extraocular eye movements intact, conjunctivae normal  Neck: neck supple and non tender without mass   Pulmonary/Chest:  diminished but otherwise clear bilaterally. Nonlabored. Cardiovascular: normal rate, normal S1 and S2   Abdomen: soft, non-tender, non-distended, normal bowel sounds  Extremities: no cyanosis, no clubbing and no edema  Neurologic: no tremor and speech normal    I/O last 3 completed shifts: In: 360 [P.O.:360]  Out: -   No intake/output data recorded. LABS:  Recent Labs     11/25/20  1427      K 4.3   CL 99   CO2 25   BUN 14   CREATININE 0.9   GLUCOSE 111*   CALCIUM 9.6       Recent Labs     11/25/20  1427   WBC 6.1   RBC 4.94   HGB 15.2   HCT 45.4   MCV 91.9   MCH 30.8   MCHC 33.5   RDW 13.3      MPV 9.0       No results for input(s): POCGLU in the last 72 hours.     CBC:   Lab Results   Component Value Date    WBC 6.1 11/25/2020    RBC 4.94 11/25/2020    HGB 15.2 11/25/2020    HCT 45.4 11/25/2020    MCV 91.9 11/25/2020    MCH 30.8 11/25/2020    MCHC 33.5 11/25/2020    RDW 13.3 11/25/2020     11/25/2020    MPV 9.0 11/25/2020     BMP:    Lab Results   Component Value Date     11/25/2020    K 4.3 11/25/2020    CL 99 11/25/2020    CO2 25 11/25/2020    BUN 14 11/25/2020    LABALBU 4.0 11/20/2020    CREATININE 0.9 11/25/2020    CALCIUM 9.6 11/25/2020    GFRAA >60 11/25/2020    LABGLOM >60 11/25/2020    GLUCOSE 111 11/25/2020       Imaging:  Xr Abdomen (kub) (single Ap View)    Result Date: 11/21/2020  EXAMINATION: ONE SUPINE XRAY VIEW(S) OF THE ABDOMEN 11/21/2020 1:56 pm COMPARISON: June 7, 2018 HISTORY: ORDERING SYSTEM PROVIDED HISTORY: vomiting TECHNOLOGIST PROVIDED HISTORY: Reason for exam:->vomiting FINDINGS: Nonspecific bowel gas pattern without evidence of obstruction. No abnormal calcifications. No acute osseous abnormality. No evidence of bowel obstruction. Xr Chest Portable    Result Date: 11/20/2020  EXAMINATION: ONE XRAY VIEW OF THE CHEST 11/20/2020 5:44 pm COMPARISON: None. HISTORY: ORDERING SYSTEM PROVIDED HISTORY: shortness of breath TECHNOLOGIST PROVIDED HISTORY: Reason for exam:->shortness of breath FINDINGS: Interstitial opacities are present bilaterally in perihilar and lung bases. No pleural effusion. The heart is normal size. No pneumothorax. Interstitial opacities are present bilaterally suggesting peribronchial inflammatory changes or pulmonary vascular congestion. Cta Head W Contrast    Addendum Date: 11/21/2020    ADDENDUM: The visualized cervical portion of the right vertebral artery V3 segment is occluded. There is reconstitution at the artery enters the dura at the V4 segment, likely from contralateral collateral flow.     Result Date: 11/21/2020  EXAMINATION: CTA OF THE HEAD WITH CONTRAST 11/20/2020 5:37 pm TECHNIQUE: CTA of the head/brain was performed with the administration of increased signal on diffusion-weighted imaging involving the right cerebellar hemisphere and vermis. There is corresponding hypointense signal on apparent diffusion coefficient mapping and increased FLAIR signal.  No hemorrhagic transformation. Multiple foci of abnormal increased T2 and FLAIR signal are present within periventricular and subcortical white matter. No abnormal extra-axial fluid collections. No hydrocephalus. Paranasal sinuses and mastoid air cells are clear. 1.  Acute to subacute right cerebellar stroke. 2.  Moderate burden of nonspecific foci of abnormal signal throughout white matter suggestive of chronic microvascular ischemia. 3.  Diminutive arterial flow void associated with right vertebral artery suggestive of high-grade stenosis or occlusion at skull base. Patient Instructions:   Current Discharge Medication List      START taking these medications    Details   aspirin 81 MG EC tablet Take 1 tablet by mouth daily  Qty: 30 tablet, Refills: 0      budesonide-formoterol (SYMBICORT) 160-4.5 MCG/ACT AERO Inhale 2 puffs into the lungs 2 times daily  Qty: 1 Inhaler, Refills: 0      albuterol sulfate HFA (VENTOLIN HFA) 108 (90 Base) MCG/ACT inhaler Inhale 2 puffs into the lungs 4 times daily as needed for Wheezing  Qty: 1 Inhaler, Refills: 0      atorvastatin (LIPITOR) 40 MG tablet Take 1 tablet by mouth nightly  Qty: 30 tablet, Refills: 0               Note that greater than 30 minutes was spent in preparing discharge papers, discussing discharge with patient, medication review, etc.    NOTE: This report was transcribed using voice recognition software.  Every effort was made to ensure accuracy; however, inadvertent computerized transcription errors may be present.     Signed:  Electronically signed by Ketty Martin DO on 11/26/2020 at 9:28 AM

## (undated) DEVICE — INTENDED FOR TISSUE SEPARATION, AND OTHER PROCEDURES THAT REQUIRE A SHARP SURGICAL BLADE TO PUNCTURE OR CUT.: Brand: BARD-PARKER ® STAINLESS STEEL BLADES

## (undated) DEVICE — NDL CNTR 40CT FM MAG: Brand: MEDLINE INDUSTRIES, INC.

## (undated) DEVICE — SET SURG INSTR SIGMOID REUSABLE

## (undated) DEVICE — GLOVE ORANGE PI 7 1/2   MSG9075

## (undated) DEVICE — TRAY PROCED CUSTOM RECTAL

## (undated) DEVICE — PENCIL ES L3M BTTN SWCH HOLSTER W/ BLDE ELECTRD EDGE

## (undated) DEVICE — SYRINGE IRRIG 60ML SFT PLIABLE BLB EZ TO GRP 1 HND USE W/

## (undated) DEVICE — PACK,UNIVERSAL,NO GOWNS: Brand: MEDLINE

## (undated) DEVICE — SIGMOIDOSCOPIC SUCTION INSTRUMENT 18 FR W/WINGED CAP CONTROL AND 6 FOOT (1.8M) TUBING: Brand: SIGMOIDOSCOPIC

## (undated) DEVICE — GAUZE,SPONGE,4"X4",16PLY,XRAY,STRL,LF: Brand: MEDLINE

## (undated) DEVICE — DOUBLE BASIN SET: Brand: MEDLINE INDUSTRIES, INC.

## (undated) DEVICE — READY WET SKIN SCRUB TRAY-LF: Brand: MEDLINE INDUSTRIES, INC.

## (undated) DEVICE — TOWEL,OR,DSP,ST,BLUE,STD,6/PK,12PK/CS: Brand: MEDLINE

## (undated) DEVICE — VASELINE PETROLATUM GAUZE STRIP: Brand: VASELINE

## (undated) DEVICE — GOWN,SIRUS,NONRNF,SETINSLV,XL,20/CS: Brand: MEDLINE

## (undated) DEVICE — TUBING, SUCTION, 1/4" X 10', STRAIGHT: Brand: MEDLINE

## (undated) DEVICE — YANKAUER,BULB TIP,W/O VENT,RIGID,STERILE: Brand: MEDLINE

## (undated) DEVICE — MARKER,SKIN,WI/RULER AND LABELS: Brand: MEDLINE

## (undated) DEVICE — GAUZE,SPONGE,4"X4",16PLY,STRL,LF,10/TRAY: Brand: MEDLINE